# Patient Record
Sex: FEMALE | Race: WHITE | Employment: UNEMPLOYED | URBAN - NONMETROPOLITAN AREA
[De-identification: names, ages, dates, MRNs, and addresses within clinical notes are randomized per-mention and may not be internally consistent; named-entity substitution may affect disease eponyms.]

---

## 2018-07-19 ENCOUNTER — HOSPITAL ENCOUNTER (OUTPATIENT)
Dept: PHYSICAL THERAPY | Age: 53
Setting detail: THERAPIES SERIES
Discharge: HOME | End: 2018-07-19
Payer: MEDICARE

## 2018-07-19 PROCEDURE — G8978 MOBILITY CURRENT STATUS: HCPCS

## 2018-07-19 PROCEDURE — 97110 THERAPEUTIC EXERCISES: CPT

## 2018-07-19 PROCEDURE — G8979 MOBILITY GOAL STATUS: HCPCS

## 2018-07-19 PROCEDURE — 97161 PT EVAL LOW COMPLEX 20 MIN: CPT

## 2018-07-19 ASSESSMENT — PAIN DESCRIPTION - FREQUENCY: FREQUENCY: CONTINUOUS

## 2018-07-19 ASSESSMENT — PAIN DESCRIPTION - PROGRESSION: CLINICAL_PROGRESSION: NOT CHANGED

## 2018-07-19 ASSESSMENT — PAIN SCALES - GENERAL: PAINLEVEL_OUTOF10: 7

## 2018-07-19 ASSESSMENT — PAIN DESCRIPTION - ORIENTATION: ORIENTATION: LEFT;RIGHT

## 2018-07-19 ASSESSMENT — PAIN DESCRIPTION - LOCATION: LOCATION: BACK

## 2018-07-19 ASSESSMENT — PAIN DESCRIPTION - DESCRIPTORS: DESCRIPTORS: SHOOTING;NUMBNESS;TINGLING

## 2018-07-19 NOTE — PROGRESS NOTES
of low complexity. Goals:  Patient goals : less back pain, move better    Short term goals  Time Frame for Short term goals: 4 weeks   Short term goal 1: improve LMLBPQ by  4 point   Short term goal 2: increase left hip flex to 95, extension to -5,   Short term goal 3: pt to demostrate good technique for 15 reps of lumbr stabs in supine and standing  Short term goal 4: increease left leg strnegth to 4- to allow for improved posture fo rsit to stand    Long term goals  Time Frame for Long term goals : 8-12 weeks  Long term goal 1: RMLPQ improve from a 21 to 14  Long term goal 2: increase left hip ext to 0 , flex to 125 to allow for good body mechanics while cleaning house  Long term goal 3: increase left leg strength to 4+   Long term goal 4: pat with be I in HEP     PT G-Codes  Functional Assessment Tool Used: Ramses pradeep LBPQ   Score: 21  Functional Limitation: Mobility: Walking and moving around  Mobility: Walking and Moving Around Current Status (): At least 80 percent but less than 100 percent impaired, limited or restricted  Mobility: Walking and Moving Around Goal Status ():  At least 40 percent but less than 60 percent impaired, limited or restricted    Beatriz Contreras PT

## 2018-07-23 ENCOUNTER — HOSPITAL ENCOUNTER (OUTPATIENT)
Dept: PHYSICAL THERAPY | Age: 53
Setting detail: THERAPIES SERIES
Discharge: HOME | End: 2018-07-23
Payer: MEDICARE

## 2018-07-23 PROCEDURE — 97110 THERAPEUTIC EXERCISES: CPT

## 2018-07-23 ASSESSMENT — PAIN DESCRIPTION - ORIENTATION: ORIENTATION: LEFT;RIGHT

## 2018-07-23 ASSESSMENT — PAIN DESCRIPTION - LOCATION: LOCATION: BACK;LEG

## 2018-07-23 ASSESSMENT — PAIN DESCRIPTION - PAIN TYPE: TYPE: CHRONIC PAIN

## 2018-07-23 ASSESSMENT — PAIN DESCRIPTION - DIRECTION: RADIATING_TOWARDS: L>R

## 2018-07-23 ASSESSMENT — PAIN SCALES - GENERAL: PAINLEVEL_OUTOF10: 8

## 2018-07-26 ENCOUNTER — HOSPITAL ENCOUNTER (OUTPATIENT)
Dept: PHYSICAL THERAPY | Age: 53
Setting detail: THERAPIES SERIES
Discharge: HOME | End: 2018-07-26
Payer: MEDICARE

## 2018-07-26 PROCEDURE — 97530 THERAPEUTIC ACTIVITIES: CPT

## 2018-07-26 PROCEDURE — 97110 THERAPEUTIC EXERCISES: CPT

## 2018-07-26 ASSESSMENT — PAIN DESCRIPTION - LOCATION: LOCATION: BACK;LEG

## 2018-07-26 ASSESSMENT — PAIN DESCRIPTION - ORIENTATION: ORIENTATION: LEFT

## 2018-07-26 ASSESSMENT — PAIN DESCRIPTION - PAIN TYPE: TYPE: CHRONIC PAIN

## 2018-07-26 ASSESSMENT — PAIN SCALES - GENERAL: PAINLEVEL_OUTOF10: 7

## 2018-07-30 ENCOUNTER — HOSPITAL ENCOUNTER (OUTPATIENT)
Dept: PHYSICAL THERAPY | Age: 53
Setting detail: THERAPIES SERIES
Discharge: HOME | End: 2018-07-30
Payer: MEDICARE

## 2018-07-30 PROCEDURE — 97110 THERAPEUTIC EXERCISES: CPT

## 2018-07-30 ASSESSMENT — PAIN DESCRIPTION - LOCATION: LOCATION: BACK;HIP

## 2018-07-30 ASSESSMENT — PAIN SCALES - GENERAL: PAINLEVEL_OUTOF10: 5

## 2018-07-30 ASSESSMENT — PAIN DESCRIPTION - ORIENTATION: ORIENTATION: LEFT

## 2018-07-30 ASSESSMENT — PAIN DESCRIPTION - PAIN TYPE: TYPE: CHRONIC PAIN

## 2018-07-30 NOTE — PROGRESS NOTES
functional moibility         Activity Tolerance:  Activity Tolerance: Patient Tolerated treatment well  Activity Tolerance: Progressed core stabs and no increased pain. \"My legs are tired. \"    Assessment: Body structures, Functions, Activity limitations: Decreased functional mobility , Decreased ADL status, Decreased ROM, Decreased strength  Assessment: Pt progressed with core stabs and 0 increased pain \"My legs are sore. \" Monitor pain. Abd bracing with all activities.    Prognosis: Good  Discharge Recommendations: Continue to assess pending progress    Patient Education:  Patient Education: HEP, abd bracing with all exs                      Plan:  Times per week: 2  Plan weeks: 4-12  Specific instructions for Next Treatment: lumbar stabs, no LROM , hip ROM, B LE strengthening,   Current Treatment Recommendations: Strengthening, ROM, Positioning, Home Exercise Program, Modalities    Goals:  Patient goals : less back pain, move better    Short term goals  Time Frame for Short term goals: 4 weeks   Short term goal 1: improve LMLBPQ by  4 point   Short term goal 2: increase left hip flex to 95, extension to -5,   Short term goal 3: pt to demostrate good technique for 15 reps of lumbr stabs in supine and standing  Short term goal 4: increease left leg strnegth to 4- to allow for improved posture fo rsit to stand    Long term goals  Time Frame for Long term goals : 8-12 weeks  Long term goal 1: RMLPQ improve from a 21 to 14  Long term goal 2: increase left hip ext to 0 , flex to 125 to allow for good body mechanics while cleaning house  Long term goal 3: increase left leg strength to 4+   Long term goal 4: pat with be I in [employer]  [id number]

## 2018-08-02 ENCOUNTER — OFFICE VISIT (OUTPATIENT)
Dept: PHYSICAL MEDICINE AND REHAB | Age: 53
End: 2018-08-02
Payer: MEDICARE

## 2018-08-02 VITALS
DIASTOLIC BLOOD PRESSURE: 91 MMHG | SYSTOLIC BLOOD PRESSURE: 150 MMHG | HEIGHT: 64 IN | WEIGHT: 201 LBS | HEART RATE: 65 BPM | BODY MASS INDEX: 34.31 KG/M2

## 2018-08-02 DIAGNOSIS — G89.4 CHRONIC PAIN SYNDROME: ICD-10-CM

## 2018-08-02 DIAGNOSIS — M47.816 LUMBAR SPONDYLOSIS: Primary | ICD-10-CM

## 2018-08-02 DIAGNOSIS — M62.838 SPASM OF MUSCLE: ICD-10-CM

## 2018-08-02 DIAGNOSIS — M96.1 POST LAMINECTOMY SYNDROME: ICD-10-CM

## 2018-08-02 DIAGNOSIS — M54.16 LUMBAR RADICULOPATHY: ICD-10-CM

## 2018-08-02 DIAGNOSIS — M48.062 LUMBAR STENOSIS WITH NEUROGENIC CLAUDICATION: ICD-10-CM

## 2018-08-02 PROCEDURE — 99204 OFFICE O/P NEW MOD 45 MIN: CPT | Performed by: NURSE PRACTITIONER

## 2018-08-02 RX ORDER — TIZANIDINE HYDROCHLORIDE 2 MG/1
2 CAPSULE, GELATIN COATED ORAL 3 TIMES DAILY PRN
Qty: 90 CAPSULE | Refills: 0 | Status: SHIPPED | OUTPATIENT
Start: 2018-08-02 | End: 2018-10-15 | Stop reason: DRUGHIGH

## 2018-08-02 ASSESSMENT — ENCOUNTER SYMPTOMS
CONSTIPATION: 1
SORE THROAT: 0
CHEST TIGHTNESS: 0
SINUS PRESSURE: 0
EYE REDNESS: 0
EYE ITCHING: 0
EYE PAIN: 0
SHORTNESS OF BREATH: 0
SINUS PAIN: 0
DIARRHEA: 1
RHINORRHEA: 0
ABDOMINAL PAIN: 1
COUGH: 0
BACK PAIN: 1
COLOR CHANGE: 0

## 2018-08-02 NOTE — PROGRESS NOTES
[address] [facility]  [address]  Dept: [phone]  Dept Fax: [phone]: [phone]    Visit Date: 8/2/2018    Srinivasa Costa is a 48 y.o. female who is referred for pain management evaluation and treatment per Dr. Wendy Gomez. CAGE and CAGE-AID Questions   1. In the last three months, have you felt you should cut down or stop drinking or using drugs? Yes []        No [x]     2. In the last three months, has anyone annoyed you or gotten on your nerves by telling you to cut down or stop drinking or using drugs? Yes []        No [x]     3. In the last three months, have you felt guilty or bad about how much you drink or use drugs? Yes []        No [x]     4. In the last three months, have you been waking up wanting to have an alcoholic drink or use drugs? Yes []        No [x]        Opioid Risk Tool:  Clinician Form       1. Family History of Substance Abuse: Female Male    Alcohol   []1   []3    Illegal drugs   []2   []3    Prescription drugs     []4   []4   2. Personal History of Substance Abuse:          Alcohol   []3   []3    Illegal drugs   []4   []4    Prescription drugs     []5   []5   3. Age (valentino box if between 12 and 39):     []1   []1   4. History of Preadolescent Sexual Abuse:     []3   []0   5. Psychological Disease:      Attention deficit disorder, obsessive-compulsive disorder, bipolar, schizophrenia   []2   []2      Depression     []1   []1    Scoring Totals       Total Score  Low Risk  Moderate Risk  High Risk   Risk Category   0 - 3   4 - 7   8 or Above      Patient states symptoms interfere with:  A.  General Activity:  yes   B. Mood: yes    C. Walking Ability:   yes   D. Normal Work (Includes both work outside the home and housework):   yes    E.  Relations with Other People:  yes   F. Sleep:   yes   G.  Enjoyment of Life:  yes       HPI:     Chief Complaint: Low back pain    HPI     Patient here today for new patient evaluation for low back pain. patient s/p lumbar fusion of L5-S1 in 2010 then up to L2 in 2016 with Dr. Angeline Jones. Patient states that initially in 2016 pain was helped for a few days, but pain returned and worsened. Patient now with radicular pain into the left leg constantly, sometimes the right leg but getting more frequent. Patient states back pain is about 60% of her pain and her left leg pain is about 40% of her pain. Patient's most recent MRI from prior to 2016 surgery, reviewed with patient. Discussed updated MRI to visualize facet joints for further planning. Patient understanding. Patient presents for presents evaluation of Low back pain. Symptoms have been present for 10+ years. Patient reports no known injury. Patient describes symptoms as numbness in left leg and feet and left low back and pain in low back, left and right legs (aching, pressure, sharp, shooting, stabbing, throbbing and constant in character; 9/10 in severity). Symptoms are worst: constant. Alleviating factors identifiable by patient are position changes. Exacerbating factors identifiable by patient are recumbency, sitting, standing, walking and same position for extended periods of time. Treatments so far initiated by patient include Physical therapy, ice, heat, home exercises, surgery, NSAIDs and tylenol. Treatments that were helpful were physical therapy while present in PT, TENs unit, , ice, heat, home exercises, surgery, NSAIDs and tylenol but all without long lasting relief. The patient is allergic to other; penicillins; and sulfa antibiotics. Past Medical History  Peri Jones  has a past medical history of Arthritis; Diabetes mellitus ([address] GERD (gastroesophageal reflux disease); Hypertension; and Thyroid disease. Past Surgical History  The patient  has a past surgical history that includes Cholecystectomy; Hysterectomy;  Appendectomy; Tonsillectomy; Colonoscopy; Lumbar spine surgery (2016); Cervical spine surgery; Foot surgery (Right); and lumbar laminectomy (1-29-14). Family History  This patient's family history includes Cancer in her father and sister; Diabetes in her brother, father, and mother; Heart Disease in her brother, father, and mother; High Blood Pressure in her father and mother; Stroke in her father. Social History  Juan Christie  reports that she has been smoking. She has a 0.50 pack-year smoking history. She has never used smokeless tobacco. She reports that she uses drugs, including Marijuana. She reports that she does not drink alcohol. Medications    Current Outpatient Prescriptions:     tiZANidine (ZANAFLEX) 2 MG capsule, Take 1 capsule by mouth 3 times daily as needed for Muscle spasms, Disp: 90 capsule, Rfl: 0    losartan-hydrochlorothiazide (HYZAAR) 100-25 MG per tablet, Take 1 tablet by mouth daily. , Disp: , Rfl:     levothyroxine (SYNTHROID) 175 MCG tablet, Take 175 mcg by mouth Daily. , Disp: , Rfl:     gabapentin (NEURONTIN) 400 MG capsule, Take 400 mg by mouth 3 times daily. , Disp: , Rfl:     citalopram (CELEXA) 40 MG tablet, Take 40 mg by mouth daily. , Disp: , Rfl:     metoprolol (TOPROL-XL) 50 MG XL tablet, Take 50 mg by mouth daily. , Disp: , Rfl:     metFORMIN (GLUCOPHAGE) 500 MG tablet, Take 500 mg by mouth daily (with breakfast). , Disp: , Rfl:     buPROPion SR (WELLBUTRIN SR) 150 MG SR tablet, Take 150 mg by mouth daily. , Disp: , Rfl:     simvastatin (ZOCOR) 20 MG tablet, Take 20 mg by mouth daily. , Disp: , Rfl:     aspirin 81 MG tablet, Take 81 mg by mouth daily. , Disp: , Rfl:     Subjective:      Review of Systems   Constitutional: Positive for activity change and fatigue. Negative for chills, diaphoresis and fever. HENT: Negative for congestion, postnasal drip, rhinorrhea, sinus pain, sinus pressure, sneezing and sore throat. Eyes: Negative for pain, redness and itching.    Respiratory: noted. She is not diaphoretic. No erythema. No pallor. Psychiatric: She has a normal mood and affect. Her speech is normal and behavior is normal. Judgment and thought content normal.     SIMA test: positive bilateral  Yeoman's test: positive bilateral  Gaenslen test: positive bilateral     Assessment:     1. Lumbar spondylosis    2. Lumbar radiculopathy    3. Lumbar stenosis with neurogenic claudication    4. Post laminectomy syndrome    5. Chronic pain syndrome    6. Spasm of muscle            Plan:      · Patient read and signed orientation and opioid agreement. · OARRS reviewed. · Discussed long term side effects of medications. · Discussed tolerance, dependency and addiction. · UDS preformed today. - patient with marijuana use daily. Discussed policy with marjuana use and narcotics medications  · Patient told can not receive any pain medications from any other source. · Discussed with patient they may not be pain free. · No evidence of abuse, diversion or aberrant behavior. · Prescription Needs: No prescription pain medications at this time   Medications and/or procedures to improve function and quality of life- patient understanding with this and that may not be pain free  Testing:  MRI Lumbar spine w/wo contrast  Procedures:  Await MRI - discussed L-facet, TFESI, SCS  Discussed with patient about risks with procedure including infection, reaction to medication, increased pain, or bleeding. Medications: Continue OTC medication, zanaflex as ordered       Previous Treatments tried:  · PT: Yes,  any benefit? Yes, short term relief, in PT right now  · NSAIDs: Yes,  any benefit? Yes,  · Chiropractic: No,    · Muscle relaxants: Yes,  Makes her too tired (flexeril)  · Narcotics: No,    · Spine surgeon consult: Yes  · Any Implants: No    Meds.  Prescribed:   Orders Placed This Encounter   Medications    tiZANidine (ZANAFLEX) 2 MG capsule     Sig: Take 1 capsule by mouth 3 times daily as needed for Muscle

## 2018-08-03 ENCOUNTER — HOSPITAL ENCOUNTER (OUTPATIENT)
Dept: PHYSICAL THERAPY | Age: 53
Setting detail: THERAPIES SERIES
Discharge: HOME | End: 2018-08-03
Payer: MEDICARE

## 2018-08-03 PROCEDURE — 97110 THERAPEUTIC EXERCISES: CPT

## 2018-08-03 ASSESSMENT — PAIN DESCRIPTION - ORIENTATION: ORIENTATION: LEFT

## 2018-08-03 ASSESSMENT — PAIN DESCRIPTION - PAIN TYPE: TYPE: CHRONIC PAIN

## 2018-08-03 ASSESSMENT — PAIN DESCRIPTION - LOCATION: LOCATION: BACK;HIP

## 2018-08-03 ASSESSMENT — PAIN SCALES - GENERAL: PAINLEVEL_OUTOF10: 7

## 2018-08-03 NOTE — PROGRESS NOTES
[address]  OUTPATIENT PHYSICAL THERAPY  DAILY NOTE  Jesús De La Cruz     Time In: 1120  Time Out: 0783  Minutes: 33  Timed Code Treatment Minutes: 33 Minutes     Date: 8/3/2018  Patient Name: Kalpesh Call,  Gender:  female        CSN: [id number]   : 1965  (48 y.o.)  Referral Date : 18    Referring Practitioner: Dr Jacinta Maciel      Diagnosis: lumbar spondylolithesis, low back pain, sp fusion  Treatment Diagnosis: SP lumbar fusion, spondylolithesis, back pain, left LE weakness,    Additional Pertinent Hx: surgery ,  most recent back  surgery 2016 fusion  to mid thoracic region. General:  PT Visit Information  Onset Date: 07/12/10  PT Insurance Information: medicare and medicaid. Total # of Visits to Date: 5               Subjective:  Chart Reviewed: Yes  Response To Previous Treatment: Patient with no complaints from previous session. Other (Comment): Aug 2nd -- Dr Keegan Khan? ? for injections     Subjective: Reports waking with increased pain today     Pain:  Patient Currently in Pain: Yes  Pain Assessment: 0-10  Pain Level: 7  Pain Type: Chronic pain  Pain Location: Back, Hip  Pain Orientation: Left      Objective                                                                                                                          Exercises  Exercise 1: Nu Step x 5 min, level 5, seat 8, arms 8  Exercise 2: Standing B heel raises, marchnig. squats x 15, 3- way hip without resistance x 10 ea  Exercise 3: Seated on swiss ball- te sets, abd sets, pelvic tilts x 10  Exercise 4: On swiss ball DLSP-UE x 10, LE x 10, UE/LE x10  Exercise 5: Seated on blue swiss ball - UE T band ther ex Red flex, ext row x 10 ea  Exercise 6: LTR x 10 ea, SKC  3 for 15 sec, B hamstring, piriformis ( Diag KTC)  Exercise 10: all exercise to stab the lumbar spine, stretch Both hips and strengthen the left LE to increase functional moibility         Activity Tolerance:  Activity Tolerance:

## 2018-08-07 ENCOUNTER — HOSPITAL ENCOUNTER (OUTPATIENT)
Dept: PHYSICAL THERAPY | Age: 53
Setting detail: THERAPIES SERIES
Discharge: HOME | End: 2018-08-07
Payer: MEDICARE

## 2018-08-07 PROCEDURE — 97110 THERAPEUTIC EXERCISES: CPT

## 2018-08-07 ASSESSMENT — PAIN DESCRIPTION - ORIENTATION: ORIENTATION: LEFT

## 2018-08-07 ASSESSMENT — PAIN DESCRIPTION - LOCATION: LOCATION: BACK;HIP

## 2018-08-07 ASSESSMENT — PAIN SCALES - GENERAL: PAINLEVEL_OUTOF10: 8

## 2018-08-07 NOTE — PROGRESS NOTES
Activity limitations: Decreased functional mobility , Decreased ADL status, Decreased ROM, Decreased strength  Prognosis: Good  Discharge Recommendations: Continue to assess pending progress    Patient Education:  Patient Education: ther ex                       Plan:  Times per week: 2  Plan weeks: 4-12  Specific instructions for Next Treatment: lumbar stabs, no LROM , hip ROM, B LE strengthening,   Current Treatment Recommendations: Strengthening, ROM, Positioning, Home Exercise Program, Modalities    Goals:  Patient goals : less back pain, move better    Short term goals  Time Frame for Short term goals: 4 weeks   Short term goal 1: improve LMLBPQ by  4 point   Short term goal 2: increase left hip flex to 95, extension to -5,   Short term goal 3: pt to demostrate good technique for 15 reps of lumbr stabs in supine and standing  Short term goal 4: increease left leg strnegth to 4- to allow for improved posture fo rsit to stand    Long term goals  Time Frame for Long term goals : 8-12 weeks  Long term goal 1: RMLPQ improve from a 21 to 14  Long term goal 2: increase left hip ext to 0 , flex to 125 to allow for good body mechanics while cleaning house  Long term goal 3: increase left leg strength to 4+   Long term goal 4: pat with be I in [facility]. [address]  [id number]

## 2018-08-08 ENCOUNTER — HOSPITAL ENCOUNTER (OUTPATIENT)
Dept: MRI IMAGING | Age: 53
Discharge: HOME | End: 2018-08-08
Payer: MEDICARE

## 2018-08-08 DIAGNOSIS — M96.1 POST LAMINECTOMY SYNDROME: ICD-10-CM

## 2018-08-08 DIAGNOSIS — M54.16 LUMBAR RADICULOPATHY: ICD-10-CM

## 2018-08-08 PROCEDURE — A9579 GAD-BASE MR CONTRAST NOS,1ML: HCPCS | Performed by: NURSE PRACTITIONER

## 2018-08-08 PROCEDURE — 6360000004 HC RX CONTRAST MEDICATION: Performed by: NURSE PRACTITIONER

## 2018-08-08 PROCEDURE — 72158 MRI LUMBAR SPINE W/O & W/DYE: CPT

## 2018-08-08 RX ADMIN — GADOTERIDOL 15 ML: 279.3 INJECTION, SOLUTION INTRAVENOUS at 19:39

## 2018-08-10 ENCOUNTER — HOSPITAL ENCOUNTER (OUTPATIENT)
Dept: PHYSICAL THERAPY | Age: 53
Setting detail: THERAPIES SERIES
Discharge: HOME | End: 2018-08-10
Payer: MEDICARE

## 2018-08-10 PROCEDURE — 97530 THERAPEUTIC ACTIVITIES: CPT

## 2018-08-10 PROCEDURE — 97110 THERAPEUTIC EXERCISES: CPT

## 2018-08-10 ASSESSMENT — PAIN DESCRIPTION - PAIN TYPE: TYPE: CHRONIC PAIN

## 2018-08-10 ASSESSMENT — PAIN DESCRIPTION - ORIENTATION: ORIENTATION: LEFT

## 2018-08-10 ASSESSMENT — PAIN DESCRIPTION - LOCATION: LOCATION: BACK;HIP

## 2018-08-10 ASSESSMENT — PAIN SCALES - GENERAL: PAINLEVEL_OUTOF10: 7

## 2018-08-13 ENCOUNTER — OFFICE VISIT (OUTPATIENT)
Dept: PHYSICAL MEDICINE AND REHAB | Age: 53
End: 2018-08-13
Payer: MEDICARE

## 2018-08-13 VITALS
HEIGHT: 64 IN | WEIGHT: 201 LBS | DIASTOLIC BLOOD PRESSURE: 80 MMHG | HEART RATE: 60 BPM | BODY MASS INDEX: 34.31 KG/M2 | SYSTOLIC BLOOD PRESSURE: 135 MMHG

## 2018-08-13 DIAGNOSIS — M54.50 LUMBAR PAIN: ICD-10-CM

## 2018-08-13 DIAGNOSIS — M96.1 POST LAMINECTOMY SYNDROME: ICD-10-CM

## 2018-08-13 DIAGNOSIS — G89.4 CHRONIC PAIN SYNDROME: ICD-10-CM

## 2018-08-13 DIAGNOSIS — M47.816 LUMBAR SPONDYLOSIS: ICD-10-CM

## 2018-08-13 DIAGNOSIS — M54.16 LUMBAR RADICULOPATHY: Primary | ICD-10-CM

## 2018-08-13 PROCEDURE — 99213 OFFICE O/P EST LOW 20 MIN: CPT | Performed by: NURSE PRACTITIONER

## 2018-08-13 ASSESSMENT — ENCOUNTER SYMPTOMS
COUGH: 0
DIARRHEA: 1
CONSTIPATION: 1
CHEST TIGHTNESS: 0
SHORTNESS OF BREATH: 0
COLOR CHANGE: 0
BACK PAIN: 1
ABDOMINAL PAIN: 1

## 2018-08-13 NOTE — PROGRESS NOTES
since last visit. Pain scale with out pain medications is 7/10. Last dose of Zanaflex was yesterday  Drug screen reviewed from 8/2/18 and was + [facility] - patient known smoker      DIAGNOSTICS   PROCEDURE: MRI LUMBAR SPINE W WO CONTRAST 8/8/18       CLINICAL INFORMATION: Lumbar radiculopathy, Post laminectomy syndrome.       COMPARISON: MRI of the lumbar spine dated June 20, 2013.       TECHNIQUE: Sagittal and axial T1 and T2-weighted images were obtained to the lumbar spine. Postcontrast axial and sagittal T1-weighted images were also obtained. Note is made that a metal artifact reducing protocol was utilized.       FINDINGS:       The images are significantly degraded by susceptibility artifact secondary to metallic surgical hardware. This limits evaluation.       The lumbar spine is imaged from the inferior aspect of T10 to the superior aspect of S3. The conus medullaris terminates at the L1 level. No abnormal signal or expansion is present within the conus. There is crowding of the nerve roots at the L1-L2    level. No abnormal enhancement is seen within the conus or visualized nerve roots.        Surgical hardware is present from L2 to S1. An intervertebral device is noted at L4-L5. The expected lumbar lordosis is preserved. Retrolisthesis is present of L1 on L2 which measures approximately 0.4 cm. This is new from the prior exam. Type I Modic    changes are noted involving the anterior aspect of the endplates at X07-Y28. Type III Modic changes are present at the endplates of J8-A5. No compression fracture deformity or suspicious marrow replacing lesion is identified.       With regards to the disc spaces, at L1-L2, there is a disc osteophyte complex which causes moderate spinal canal narrowing. There is suggestion of mild neural foraminal narrowing on the right with moderate neural foraminal narrowing on the left.  These    changes are worse compared to the prior exam.       At L2-L3, there is a disc osteophyte complex. Postsurgical changes are present and the spinal canal is patent. Susceptibility artifact limits evaluation of the neural foramina however, no significant stenosis is identified.       At L3-L4, there is a disc osteophyte complex. Postsurgical changes are present and the spinal canal is patent. Evaluation of the neural foramina is limited secondary to susceptibility artifact however, no significant stenosis is identified.       The remaining levels are secured secondary to susceptibility artifact.       There is a fluid collection within the laminectomy bed extending from L3 to L5. It measures approximately 1.0 x 2.0 x 7.5 cm. No suspicious finding is otherwise identified within the visualized retroperitoneal or paraspinal soft tissues. Note is made    that there is fatty atrophy of the posterior paraspinal musculature.           Impression       1. Postsurgical and multilevel degenerative changes are present within the lumbar spine and are further discussed by level in the findings. Most significantly, there are worsened degenerative changes at L1-L2 with a disc osteophyte complex causing    moderate spinal canal narrowing. There is also suggestion of moderate neural foraminal narrowing on the left at this level with mild neural foraminal narrowing on the right. Note is made that the L4-5 and L5-S1 levels are significantly degraded secondary    to susceptibility artifact from surgical hardware. If clinically warranted, CT myelogram may be considered.       2. There is a fluid collection within the laminectomy bed extending from L3 to L5. This likely corresponds to a postsurgical seroma.             The patient is allergic to other; penicillins; and sulfa antibiotics. Past Medical History  Duard Rubinstein  has a past medical history of Arthritis; Diabetes mellitus ([address] GERD (gastroesophageal reflux disease); Hypertension; and Thyroid disease.     Past Surgical History  The patient  has a past surgical history that includes Cholecystectomy; Hysterectomy; Appendectomy; Tonsillectomy; Colonoscopy; Lumbar spine surgery (2016); Cervical spine surgery; Foot surgery (Right); and lumbar laminectomy (1-29-14). Family History  This patient's family history includes Cancer in her father and sister; Diabetes in her brother, father, and mother; Heart Disease in her brother, father, and mother; High Blood Pressure in her father and mother; Stroke in her father. Social History  Daren Inman  reports that she has been smoking. She has a 0.50 pack-year smoking history. She has never used smokeless tobacco. She reports that she uses drugs, including Marijuana. She reports that she does not drink alcohol. Medications    Current Outpatient Prescriptions:     tiZANidine (ZANAFLEX) 2 MG capsule, Take 1 capsule by mouth 3 times daily as needed for Muscle spasms, Disp: 90 capsule, Rfl: 0    losartan-hydrochlorothiazide (HYZAAR) 100-25 MG per tablet, Take 1 tablet by mouth daily. , Disp: , Rfl:     levothyroxine (SYNTHROID) 175 MCG tablet, Take 175 mcg by mouth Daily. , Disp: , Rfl:     gabapentin (NEURONTIN) 400 MG capsule, Take 400 mg by mouth 3 times daily. , Disp: , Rfl:     citalopram (CELEXA) 40 MG tablet, Take 40 mg by mouth daily. , Disp: , Rfl:     metoprolol (TOPROL-XL) 50 MG XL tablet, Take 50 mg by mouth daily. , Disp: , Rfl:     metFORMIN (GLUCOPHAGE) 500 MG tablet, Take 500 mg by mouth daily (with breakfast). , Disp: , Rfl:     buPROPion SR (WELLBUTRIN SR) 150 MG SR tablet, Take 150 mg by mouth daily. , Disp: , Rfl:     simvastatin (ZOCOR) 20 MG tablet, Take 20 mg by mouth daily. , Disp: , Rfl:     aspirin 81 MG tablet, Take 81 mg by mouth daily. , Disp: , Rfl:     Subjective:      Review of Systems   Constitutional: Positive for activity change and fatigue. Negative for chills, diaphoresis and fever. Respiratory: Negative for cough, chest tightness and shortness of breath.     Cardiovascular: Negative for chest pain and spouse

## 2018-08-14 ENCOUNTER — APPOINTMENT (OUTPATIENT)
Dept: PHYSICAL THERAPY | Age: 53
End: 2018-08-14
Payer: MEDICARE

## 2018-08-15 ENCOUNTER — HOSPITAL ENCOUNTER (OUTPATIENT)
Dept: PHYSICAL THERAPY | Age: 53
Setting detail: THERAPIES SERIES
Discharge: HOME | End: 2018-08-15
Payer: MEDICARE

## 2018-08-15 PROCEDURE — 97110 THERAPEUTIC EXERCISES: CPT

## 2018-08-15 ASSESSMENT — PAIN DESCRIPTION - PAIN TYPE: TYPE: CHRONIC PAIN

## 2018-08-15 ASSESSMENT — PAIN DESCRIPTION - LOCATION: LOCATION: BACK;HIP

## 2018-08-15 ASSESSMENT — PAIN SCALES - GENERAL: PAINLEVEL_OUTOF10: 6

## 2018-08-15 ASSESSMENT — PAIN DESCRIPTION - ORIENTATION: ORIENTATION: LEFT

## 2018-08-15 NOTE — PROGRESS NOTES
[address]  OUTPATIENT PHYSICAL THERAPY  DAILY NOTE  Graciela Watts     Time In: 3428  Time Out: 1110  Minutes: 30  Timed Code Treatment Minutes: 30 Minutes     Date: 8/15/2018  Patient Name: Keanu Charles,  Gender:  female        CSN: [id number]   : 1965  (48 y.o.)  Referral Date : 18    Referring Practitioner: Dr Guilherme Lara      Diagnosis: lumbar spondylolithesis, low back pain, sp fusion  Treatment Diagnosis: SP lumbar fusion, spondylolithesis, back pain, left LE weakness,    Additional Pertinent Hx: surgery ,  most recent back  surgery 2016 fusion  to mid thoracic region. General:  PT Visit Information  Onset Date: 07/12/10  PT Insurance Information: medicare and medicaid. Total # of Visits to Date: 8  Plan of Care/Certification Expiration Date: 10/11/18               Subjective:  Chart Reviewed: Yes  Other (Comment): Aug 2nd -- Dr Vamsi Watson for injections     Subjective: Patient reports her lower back pain is 6/10, no significant change with therapy. Still has difficulty bending to reach items off the floor, walking, and sitting long durations. She had an MRI last week which shows L1/L2 disc bulge pinching the spinal cord. She still occasionally has LLE radicular pain down to calf and foot, and L calf swelling.       Pain:  Patient Currently in Pain: Yes  Pain Assessment: 0-10  Pain Level: 6  Pain Type: Chronic pain  Pain Location: Back, Hip  Pain Orientation: Left  Pain Radiating Towards: L >R      Objective    Exercises  Exercise 1: Nu Step x 5 min, level 5, seat 8, arms 8   Exercise 2: Standing B heel raises, marchnig. squats x 15, 3- way hip without resistance x 10 ea   Exercise 3: Seated on swiss ball- glute sets, abd sets, pelvic tilts x 10   Exercise 4: On swiss ball DLSP-UE x 10, LE x 10, UE/LE x10  Exercise 5: Seated on blue swiss ball - UE T band ther ex Red flex, ext row x 10 ea   Exercise 6: LTR x 10 ea, Left SKC  3 for 15 sec, B hamstring with towel 3x 10 sec, piriformis ( Diag KT)   Exercise 7: Trialed bridge x10 reps   Exercise 10: all exercise to stab the lumbar spine, stretch Both hips and strengthen the left LE to increase functional moibility         Activity Tolerance:  Activity Tolerance: Patient Tolerated treatment well    Assessment: Body structures, Functions, Activity limitations: Decreased functional mobility , Decreased ADL status, Decreased ROM, Decreased strength  Assessment: Minimal change in pain with therapy. Patient planning to discharge next appointment.    Prognosis: Good  Discharge Recommendations: Continue to assess pending progress    Patient Education:  Patient Education: ther ex                       Plan:  Times per week: 2  Plan weeks: 4-12  Specific instructions for Next Treatment: lumbar stabs, no LROM , hip ROM, B LE strengthening,   Current Treatment Recommendations: Strengthening, ROM, Positioning, Home Exercise Program, Modalities    Goals:  Patient goals : less back pain, move better    Short term goals  Time Frame for Short term goals: 4 weeks   Short term goal 1: improve LMLBPQ by  4 point   Short term goal 2: increase left hip flex to 95, extension to -5,   Short term goal 3: pt to demostrate good technique for 15 reps of lumbr stabs in supine and standing  Short term goal 4: increease left leg strnegth to 4- to allow for improved posture fo rsit to stand    Long term goals  Time Frame for Long term goals : 8-12 weeks  Long term goal 1: RMLPQ improve from a 21 to 14  Long term goal 2: increase left hip ext to 0 , flex to 125 to allow for good body mechanics while cleaning house  Long term goal 3: increase left leg strength to 4+   Long term goal 4: pat with be I in [facility] [place], PT

## 2018-08-16 ENCOUNTER — HOSPITAL ENCOUNTER (OUTPATIENT)
Dept: PHYSICAL THERAPY | Age: 53
Setting detail: THERAPIES SERIES
Discharge: HOME | End: 2018-08-16
Payer: MEDICARE

## 2018-08-16 PROCEDURE — G8979 MOBILITY GOAL STATUS: HCPCS

## 2018-08-16 PROCEDURE — G8980 MOBILITY D/C STATUS: HCPCS

## 2018-08-16 PROCEDURE — 97110 THERAPEUTIC EXERCISES: CPT

## 2018-08-16 ASSESSMENT — PAIN SCALES - GENERAL: PAINLEVEL_OUTOF10: 5

## 2018-08-16 NOTE — PROGRESS NOTES
[facility] [address]  OUTPATIENT PHYSICAL THERAPY  DISCHARGE NOTE  Nate Cain     Time In: 2840  Time Out: 2348  Minutes: 31  Timed Code Treatment Minutes: 31 Minutes     Date: 2018  Patient Name: Masoud Mcrae,  Gender:  female        CSN: [id number]   : 1965  (48 y.o.)  Referral Date : 18    Referring Practitioner: Dr Phuong Mistry      Diagnosis: lumbar spondylolithesis, low back pain, sp fusion  Treatment Diagnosis: SP lumbar fusion, spondylolithesis, back pain, left LE weakness,    Additional Pertinent Hx: surgery ,  most recent back  surgery 2016 fusion  to mid thoracic region. General:  PT Visit Information  Onset Date: 07/12/10  PT Insurance Information: medicare and medicaid. Total # of Visits to Date: 5  Plan of Care/Certification Expiration Date: 10/11/18               Subjective:  Chart Reviewed: Yes     Subjective: Patient reports her lower back pain is 5/10, She had an MRI last week which she reported shows L1/L2 disc bulge pinching the spinal cord. SHe is going to have injections through pain management on Set 11th. Pain:  Patient Currently in Pain: Yes  Pain Level: 5      Objective         Increased walking speed, and normal step length.    Exercises  Exercise 1: Nu Step x 5 min, level 5, seat 8, arms 8   Exercise 2: Standing B heel raises, marchnig. squats x 15, 3- way hip without resistance x 10 ea   Exercise 3: Seated on swiss ball- glute sets, abd sets, pelvic tilts x 10   Exercise 4: On swiss ball DLSP-UE x 10, LE x 10, UE/LE x10  Exercise 5: Seated on blue swiss ball - UE T band ther ex Red flex, ext row x 10 ea   Exercise 6: LTR x 10 ea, Left SKC  3 for 15 sec, B hamstring with towel 3x 10 sec, piriformis ( Diag KTC)   Exercise 7: bridge x10 reps   Exercise 10: all exercise to stab the lumbar spine, stretch Both hips and strengthen the left LE to increase functional moibility         Activity Tolerance:  Activity Tolerance: Patient Tolerated treatment well    Assessment: Body structures, Functions, Activity limitations: Decreased functional mobility , Decreased ADL status  Assessment: Minimal change in pain with therapy, but better motions. Prognosis: Good       Patient Education:  Patient Education: exericse, posture,                       Plan:  Times per week: discharge    Goals:  Patient goals : less back pain, move better    Short term goals  Time Frame for Short term goals: 4 weeks   Short term goal 1: improve LMLBPQ by 4 point -- partially met  cahnged from 21 to 19 points,     Short term goal 2: increase left hip flex to 95, extension to -5   MET    Short term goal 3: pt to demostrate good technique for 15 reps of lumbr stabs in supine and standing  MET    Short term goal 4: increase left leg strength to 4- to allow for improved posture for sit to stand -- NOT met now at 4/5  limited due to pain with resistance      Long term goals  Time Frame for Long term goals : 8-12 weeks  Long term goal 1: RMLPQ improve from a 21 to 14  NOT MET   Long term goal 2: increase left hip ext to 0 , flex to 125 to allow for good body mechanics while cleaning house  Partially met ,     hip flex to  115 on the left  , ext to 0    Long term goal 3: increase left leg strength to 4+   NOt met see above. Long term goal 4: pat with be I in HEP  MET      PT G-Codes  Functional Assessment Tool Used: Nichol Lyndon LBPQ   Score: 19  Functional Limitation: Mobility: Walking and moving around  Mobility: Walking and Moving Around Goal Status (): At least 40 percent but less than 60 percent impaired, limited or restricted  Mobility: Walking and Moving Around Discharge Status ():  At least 60 percent but less than 80 percent impaired, limited or restricted    Anshu Pelayo, PT

## 2018-09-11 ENCOUNTER — ANESTHESIA EVENT (OUTPATIENT)
Dept: OPERATING ROOM | Age: 53
End: 2018-09-11
Payer: MEDICARE

## 2018-09-11 ENCOUNTER — HOSPITAL ENCOUNTER (OUTPATIENT)
Age: 53
Setting detail: OUTPATIENT SURGERY
Discharge: HOME | End: 2018-09-11
Attending: PAIN MEDICINE | Admitting: PAIN MEDICINE
Payer: MEDICARE

## 2018-09-11 ENCOUNTER — APPOINTMENT (OUTPATIENT)
Dept: GENERAL RADIOLOGY | Age: 53
End: 2018-09-11
Attending: PAIN MEDICINE
Payer: MEDICARE

## 2018-09-11 ENCOUNTER — ANESTHESIA (OUTPATIENT)
Dept: OPERATING ROOM | Age: 53
End: 2018-09-11
Payer: MEDICARE

## 2018-09-11 VITALS
TEMPERATURE: 97.4 F | RESPIRATION RATE: 20 BRPM | HEART RATE: 56 BPM | DIASTOLIC BLOOD PRESSURE: 61 MMHG | BODY MASS INDEX: 36.86 KG/M2 | WEIGHT: 208 LBS | HEIGHT: 63 IN | SYSTOLIC BLOOD PRESSURE: 110 MMHG | OXYGEN SATURATION: 96 %

## 2018-09-11 VITALS
RESPIRATION RATE: 5 BRPM | DIASTOLIC BLOOD PRESSURE: 71 MMHG | SYSTOLIC BLOOD PRESSURE: 117 MMHG | OXYGEN SATURATION: 97 %

## 2018-09-11 LAB — GLUCOSE BLD-MCNC: 108 MG/DL (ref 70–108)

## 2018-09-11 PROCEDURE — 82948 REAGENT STRIP/BLOOD GLUCOSE: CPT

## 2018-09-11 PROCEDURE — 6360000004 HC RX CONTRAST MEDICATION: Performed by: PAIN MEDICINE

## 2018-09-11 PROCEDURE — 2709999900 HC NON-CHARGEABLE SUPPLY: Performed by: PAIN MEDICINE

## 2018-09-11 PROCEDURE — 7100000010 HC PHASE II RECOVERY - FIRST 15 MIN: Performed by: PAIN MEDICINE

## 2018-09-11 PROCEDURE — 3209999900 FLUORO FOR SURGICAL PROCEDURES

## 2018-09-11 PROCEDURE — 2580000003 HC RX 258: Performed by: PAIN MEDICINE

## 2018-09-11 PROCEDURE — 62323 NJX INTERLAMINAR LMBR/SAC: CPT | Performed by: PAIN MEDICINE

## 2018-09-11 PROCEDURE — 6360000002 HC RX W HCPCS: Performed by: PAIN MEDICINE

## 2018-09-11 PROCEDURE — 3600000054 HC PAIN LEVEL 3 BASE: Performed by: PAIN MEDICINE

## 2018-09-11 PROCEDURE — 2500000003 HC RX 250 WO HCPCS: Performed by: NURSE ANESTHETIST, CERTIFIED REGISTERED

## 2018-09-11 PROCEDURE — 3700000000 HC ANESTHESIA ATTENDED CARE: Performed by: PAIN MEDICINE

## 2018-09-11 PROCEDURE — 6360000002 HC RX W HCPCS: Performed by: NURSE ANESTHETIST, CERTIFIED REGISTERED

## 2018-09-11 PROCEDURE — 7100000011 HC PHASE II RECOVERY - ADDTL 15 MIN: Performed by: PAIN MEDICINE

## 2018-09-11 PROCEDURE — 2500000003 HC RX 250 WO HCPCS: Performed by: PAIN MEDICINE

## 2018-09-11 RX ORDER — 0.9 % SODIUM CHLORIDE 0.9 %
VIAL (ML) INJECTION PRN
Status: DISCONTINUED | OUTPATIENT
Start: 2018-09-11 | End: 2018-09-11 | Stop reason: HOSPADM

## 2018-09-11 RX ORDER — DEXAMETHASONE SODIUM PHOSPHATE 4 MG/ML
INJECTION, SOLUTION INTRA-ARTICULAR; INTRALESIONAL; INTRAMUSCULAR; INTRAVENOUS; SOFT TISSUE PRN
Status: DISCONTINUED | OUTPATIENT
Start: 2018-09-11 | End: 2018-09-11 | Stop reason: HOSPADM

## 2018-09-11 RX ORDER — LIDOCAINE HYDROCHLORIDE 20 MG/ML
INJECTION, SOLUTION INFILTRATION; PERINEURAL PRN
Status: DISCONTINUED | OUTPATIENT
Start: 2018-09-11 | End: 2018-09-11 | Stop reason: SDUPTHER

## 2018-09-11 RX ORDER — LIDOCAINE HYDROCHLORIDE 10 MG/ML
INJECTION, SOLUTION INFILTRATION; PERINEURAL PRN
Status: DISCONTINUED | OUTPATIENT
Start: 2018-09-11 | End: 2018-09-11 | Stop reason: HOSPADM

## 2018-09-11 RX ORDER — PROPOFOL 10 MG/ML
INJECTION, EMULSION INTRAVENOUS PRN
Status: DISCONTINUED | OUTPATIENT
Start: 2018-09-11 | End: 2018-09-11 | Stop reason: SDUPTHER

## 2018-09-11 RX ORDER — FENTANYL CITRATE 50 UG/ML
INJECTION, SOLUTION INTRAMUSCULAR; INTRAVENOUS PRN
Status: DISCONTINUED | OUTPATIENT
Start: 2018-09-11 | End: 2018-09-11 | Stop reason: SDUPTHER

## 2018-09-11 RX ORDER — BUPIVACAINE HYDROCHLORIDE 2.5 MG/ML
INJECTION, SOLUTION EPIDURAL; INFILTRATION; INTRACAUDAL PRN
Status: DISCONTINUED | OUTPATIENT
Start: 2018-09-11 | End: 2018-09-11 | Stop reason: HOSPADM

## 2018-09-11 RX ADMIN — FENTANYL CITRATE 50 MCG: 50 INJECTION INTRAMUSCULAR; INTRAVENOUS at 09:10

## 2018-09-11 RX ADMIN — LIDOCAINE HYDROCHLORIDE 20 MG: 20 INJECTION, SOLUTION INFILTRATION; PERINEURAL at 09:15

## 2018-09-11 RX ADMIN — FENTANYL CITRATE 50 MCG: 50 INJECTION INTRAMUSCULAR; INTRAVENOUS at 09:14

## 2018-09-11 RX ADMIN — PROPOFOL 60 MG: 10 INJECTION, EMULSION INTRAVENOUS at 09:15

## 2018-09-11 ASSESSMENT — PULMONARY FUNCTION TESTS
PIF_VALUE: 0

## 2018-09-11 ASSESSMENT — PAIN DESCRIPTION - DESCRIPTORS: DESCRIPTORS: ACHING;CONSTANT;DISCOMFORT

## 2018-09-11 ASSESSMENT — PAIN SCALES - GENERAL: PAINLEVEL_OUTOF10: 0

## 2018-09-11 ASSESSMENT — PAIN - FUNCTIONAL ASSESSMENT: PAIN_FUNCTIONAL_ASSESSMENT: 0-10

## 2018-09-11 NOTE — ANESTHESIA PRE PROCEDURE
02/10/2015    CREATININE 0.6 02/10/2015    LABGLOM >90 02/10/2015    GLUCOSE 100 02/10/2015    GLUCOSE 121 10/01/2011    PROT 7.3 02/10/2015    CALCIUM 9.4 02/10/2015    BILITOT 0.3 02/10/2015    ALKPHOS 102 02/10/2015    AST 20 02/10/2015    ALT 20 02/10/2015       POC Tests: No results for input(s): POCGLU, POCNA, POCK, POCCL, POCBUN, POCHEMO, POCHCT in the last 72 hours. Coags: No results found for: PROTIME, INR, APTT    HCG (If Applicable): No results found for: PREGTESTUR, PREGSERUM, HCG, HCGQUANT     ABGs: No results found for: PHART, PO2ART, WKA2IDS, PFO8WIN, BEART, T5SAIYCZ     Type & Screen (If Applicable):  Lab Results   Component Value Date    LABRH POS 01/29/2014       Anesthesia Evaluation    Airway: Mallampati: II       Mouth opening: > = 3 FB Dental:          Pulmonary:                              Cardiovascular:    (+) hypertension:,                   Neuro/Psych:               GI/Hepatic/Renal:   (+) GERD:,           Endo/Other:    (+) Diabetes, . Abdominal:   (+) obese,         Vascular:                                        Anesthesia Plan      MAC     ASA 3             Anesthetic plan and risks discussed with patient. Plan discussed with CRNA.                   Emmanuel Freedman MD   9/11/2018

## 2018-09-11 NOTE — H&P
[facility]  History and Physical Update    Pt Name: Masoud Mcrae  MRN: [id number]  YOB: 1965  Date of evaluation: 9/11/2018      I have examined the patient and reviewed the H&P/Consult and there are no changes to the patient or plans.         Electronically signed by Liv Caldera MD on 9/11/2018 at 8:31 AM

## 2018-09-11 NOTE — PROGRESS NOTES
1801:  Pt arrival to phase II recovery, VSS, POx 96% RA. Pt denies pain/nausea. Pt given drink/snack, family to bedside. Pt resting, call light in reach. Side rails up x 2.      0945:  Pt & family educated on d/c instructions, both verbalize understanding. All belongings returned to pt. Pt dressing for d/c.    9085:  Pt escorted to passenger side of car, family driving her home. Pt d/c in stable condition.

## 2018-10-15 ENCOUNTER — OFFICE VISIT (OUTPATIENT)
Dept: PHYSICAL MEDICINE AND REHAB | Age: 53
End: 2018-10-15
Payer: MEDICARE

## 2018-10-15 VITALS
WEIGHT: 211 LBS | SYSTOLIC BLOOD PRESSURE: 157 MMHG | BODY MASS INDEX: 36.02 KG/M2 | HEART RATE: 59 BPM | HEIGHT: 64 IN | DIASTOLIC BLOOD PRESSURE: 89 MMHG

## 2018-10-15 DIAGNOSIS — M54.16 LUMBAR RADICULOPATHY: Primary | ICD-10-CM

## 2018-10-15 DIAGNOSIS — M54.50 LUMBAR PAIN: ICD-10-CM

## 2018-10-15 DIAGNOSIS — M96.1 POST LAMINECTOMY SYNDROME: ICD-10-CM

## 2018-10-15 DIAGNOSIS — G89.4 CHRONIC PAIN SYNDROME: ICD-10-CM

## 2018-10-15 DIAGNOSIS — M47.816 LUMBAR SPONDYLOSIS: ICD-10-CM

## 2018-10-15 PROCEDURE — 99213 OFFICE O/P EST LOW 20 MIN: CPT | Performed by: NURSE PRACTITIONER

## 2018-10-15 RX ORDER — TIZANIDINE 4 MG/1
4 TABLET ORAL EVERY 8 HOURS PRN
Qty: 90 TABLET | Refills: 0 | Status: SHIPPED | OUTPATIENT
Start: 2018-10-15

## 2018-10-15 ASSESSMENT — ENCOUNTER SYMPTOMS
BACK PAIN: 1
CHEST TIGHTNESS: 0
ABDOMINAL PAIN: 1
DIARRHEA: 1
COLOR CHANGE: 0
CONSTIPATION: 1
SHORTNESS OF BREATH: 0
COUGH: 0

## 2018-10-26 ENCOUNTER — ANESTHESIA (OUTPATIENT)
Dept: OPERATING ROOM | Age: 53
End: 2018-10-26
Payer: MEDICARE

## 2018-10-26 ENCOUNTER — APPOINTMENT (OUTPATIENT)
Dept: GENERAL RADIOLOGY | Age: 53
End: 2018-10-26
Attending: PAIN MEDICINE
Payer: MEDICARE

## 2018-10-26 ENCOUNTER — HOSPITAL ENCOUNTER (OUTPATIENT)
Age: 53
Setting detail: OUTPATIENT SURGERY
Discharge: HOME | End: 2018-10-26
Attending: PAIN MEDICINE | Admitting: PAIN MEDICINE
Payer: MEDICARE

## 2018-10-26 ENCOUNTER — ANESTHESIA EVENT (OUTPATIENT)
Dept: OPERATING ROOM | Age: 53
End: 2018-10-26
Payer: MEDICARE

## 2018-10-26 VITALS
SYSTOLIC BLOOD PRESSURE: 106 MMHG | BODY MASS INDEX: 37.46 KG/M2 | RESPIRATION RATE: 15 BRPM | DIASTOLIC BLOOD PRESSURE: 59 MMHG | HEART RATE: 57 BPM | TEMPERATURE: 98 F | WEIGHT: 211.4 LBS | HEIGHT: 63 IN | OXYGEN SATURATION: 93 %

## 2018-10-26 VITALS
DIASTOLIC BLOOD PRESSURE: 81 MMHG | SYSTOLIC BLOOD PRESSURE: 131 MMHG | OXYGEN SATURATION: 96 % | RESPIRATION RATE: 7 BRPM

## 2018-10-26 LAB — GLUCOSE BLD-MCNC: 105 MG/DL (ref 70–108)

## 2018-10-26 PROCEDURE — 7100000010 HC PHASE II RECOVERY - FIRST 15 MIN: Performed by: PAIN MEDICINE

## 2018-10-26 PROCEDURE — 62323 NJX INTERLAMINAR LMBR/SAC: CPT | Performed by: PAIN MEDICINE

## 2018-10-26 PROCEDURE — 3600000054 HC PAIN LEVEL 3 BASE: Performed by: PAIN MEDICINE

## 2018-10-26 PROCEDURE — 3209999900 FLUORO FOR SURGICAL PROCEDURES

## 2018-10-26 PROCEDURE — 6360000002 HC RX W HCPCS: Performed by: NURSE ANESTHETIST, CERTIFIED REGISTERED

## 2018-10-26 PROCEDURE — 2580000003 HC RX 258: Performed by: PAIN MEDICINE

## 2018-10-26 PROCEDURE — 2500000003 HC RX 250 WO HCPCS: Performed by: NURSE ANESTHETIST, CERTIFIED REGISTERED

## 2018-10-26 PROCEDURE — 3700000000 HC ANESTHESIA ATTENDED CARE: Performed by: PAIN MEDICINE

## 2018-10-26 PROCEDURE — 82948 REAGENT STRIP/BLOOD GLUCOSE: CPT

## 2018-10-26 PROCEDURE — 2500000003 HC RX 250 WO HCPCS: Performed by: PAIN MEDICINE

## 2018-10-26 PROCEDURE — 6360000002 HC RX W HCPCS: Performed by: PAIN MEDICINE

## 2018-10-26 PROCEDURE — 7100000011 HC PHASE II RECOVERY - ADDTL 15 MIN: Performed by: PAIN MEDICINE

## 2018-10-26 PROCEDURE — 6360000004 HC RX CONTRAST MEDICATION: Performed by: PAIN MEDICINE

## 2018-10-26 PROCEDURE — 2709999900 HC NON-CHARGEABLE SUPPLY: Performed by: PAIN MEDICINE

## 2018-10-26 RX ORDER — FENTANYL CITRATE 50 UG/ML
INJECTION, SOLUTION INTRAMUSCULAR; INTRAVENOUS PRN
Status: DISCONTINUED | OUTPATIENT
Start: 2018-10-26 | End: 2018-10-26 | Stop reason: SDUPTHER

## 2018-10-26 RX ORDER — LIDOCAINE HYDROCHLORIDE 10 MG/ML
INJECTION, SOLUTION INFILTRATION; PERINEURAL PRN
Status: DISCONTINUED | OUTPATIENT
Start: 2018-10-26 | End: 2018-10-26 | Stop reason: HOSPADM

## 2018-10-26 RX ORDER — LIDOCAINE HYDROCHLORIDE 20 MG/ML
INJECTION, SOLUTION INFILTRATION; PERINEURAL PRN
Status: DISCONTINUED | OUTPATIENT
Start: 2018-10-26 | End: 2018-10-26 | Stop reason: SDUPTHER

## 2018-10-26 RX ORDER — DEXAMETHASONE SODIUM PHOSPHATE 4 MG/ML
INJECTION, SOLUTION INTRA-ARTICULAR; INTRALESIONAL; INTRAMUSCULAR; INTRAVENOUS; SOFT TISSUE PRN
Status: DISCONTINUED | OUTPATIENT
Start: 2018-10-26 | End: 2018-10-26 | Stop reason: HOSPADM

## 2018-10-26 RX ORDER — PROPOFOL 10 MG/ML
INJECTION, EMULSION INTRAVENOUS PRN
Status: DISCONTINUED | OUTPATIENT
Start: 2018-10-26 | End: 2018-10-26 | Stop reason: SDUPTHER

## 2018-10-26 RX ORDER — 0.9 % SODIUM CHLORIDE 0.9 %
VIAL (ML) INJECTION PRN
Status: DISCONTINUED | OUTPATIENT
Start: 2018-10-26 | End: 2018-10-26 | Stop reason: HOSPADM

## 2018-10-26 RX ADMIN — LIDOCAINE HYDROCHLORIDE 40 MG: 20 INJECTION, SOLUTION INFILTRATION; PERINEURAL at 09:25

## 2018-10-26 RX ADMIN — FENTANYL CITRATE 50 MCG: 50 INJECTION INTRAMUSCULAR; INTRAVENOUS at 09:24

## 2018-10-26 RX ADMIN — PROPOFOL 50 MG: 10 INJECTION, EMULSION INTRAVENOUS at 09:26

## 2018-10-26 RX ADMIN — FENTANYL CITRATE 50 MCG: 50 INJECTION INTRAMUSCULAR; INTRAVENOUS at 09:27

## 2018-10-26 ASSESSMENT — PULMONARY FUNCTION TESTS
PIF_VALUE: 0

## 2018-10-26 ASSESSMENT — PAIN SCALES - GENERAL: PAINLEVEL_OUTOF10: 0

## 2018-10-26 ASSESSMENT — LIFESTYLE VARIABLES: SMOKING_STATUS: 1

## 2018-10-26 ASSESSMENT — PAIN - FUNCTIONAL ASSESSMENT: PAIN_FUNCTIONAL_ASSESSMENT: 0-10

## 2018-10-26 NOTE — ANESTHESIA POSTPROCEDURE EVALUATION
Department of Anesthesiology  Postprocedure Note    Patient: Mariela Mckeon  MRN: [id number]  YOB: 1965  Date of evaluation: 10/26/2018  Time:  10:43 AM     Procedure Summary     Date:  10/26/18 Room / Location:  [facility] OR [facility] Radu Kellogg    Anesthesia Start:  4665 Anesthesia Stop:  0799    Procedure:  LESI #2 at L1 (N/A ) Diagnosis:  (LUMBAR RADICULITIS)    Surgeon:  Asad Smith MD Responsible Provider:  Hina Miranda MD    Anesthesia Type:  MAC ASA Status:  3          Anesthesia Type: MAC    Vlad Phase I:      Vlad Phase II: Vlad Score: 10    Last vitals: Reviewed and per EMR flowsheets.        Anesthesia Post Evaluation    Nausea & Vomiting: no nausea

## 2018-10-26 NOTE — H&P
[facility]  History and Physical Update    Pt Name: Guicho Ramírez  MRN: [id number]  YOB: 1965  Date of evaluation: 10/26/2018      I have examined the patient and reviewed the H&P/Consult and there are no changes to the patient or plans.         Electronically signed by Madelin Araujo MD on 10/26/2018 at 8:59 AM

## 2018-10-26 NOTE — ANESTHESIA PRE PROCEDURE
Department of Anesthesiology  Preprocedure Note       Name:  Jay Emmanuel   Age:  48 y.o.  :  1965                                          MRN:  [id number]         Date:  10/26/2018      Surgeon: Ravinder Corona):  Meredith Malagon MD    Procedure: Procedure(s):  LESI  L1 OR OTHER POSSIBLE LEVEL    Medications prior to admission:   Prior to Admission medications    Medication Sig Start Date End Date Taking? Authorizing Provider   tiZANidine (ZANAFLEX) 4 MG tablet Take 1 tablet by mouth every 8 hours as needed (muscle spasms) 10/15/18   Patti Wilson APRN - CNP   losartan-hydrochlorothiazide (HYZAAR) 100-25 MG per tablet Take 1 tablet by mouth daily. Historical Provider, MD   levothyroxine (SYNTHROID) 175 MCG tablet Take 175 mcg by mouth Daily. Historical Provider, MD   gabapentin (NEURONTIN) 400 MG capsule Take 400 mg by mouth 3 times daily. Historical Provider, MD   citalopram (CELEXA) 40 MG tablet Take 40 mg by mouth daily. Historical Provider, MD   metoprolol (TOPROL-XL) 50 MG XL tablet Take 50 mg by mouth daily. Historical Provider, MD   buPROPion SR (WELLBUTRIN SR) 150 MG SR tablet Take 150 mg by mouth daily. Historical Provider, MD   simvastatin (ZOCOR) 20 MG tablet Take 20 mg by mouth daily. Historical Provider, MD   aspirin 81 MG tablet Take 81 mg by mouth daily. Historical Provider, MD       Current medications:    No current outpatient prescriptions on file. No current facility-administered medications for this visit. Allergies:     Allergies   Allergen Reactions    Other      Cipro and Toradol together caused kidney problems     Penicillins Swelling    Sulfa Antibiotics Swelling       Problem List:    Patient Active Problem List   Diagnosis Code    Spinal stenosis, lumbar region, with neurogenic claudication M48.062    Lumbar radiculopathy M54.16    Lumbar post-laminectomy syndrome M96.1       Past Medical History:        Diagnosis Date    Arthritis

## 2018-10-31 ENCOUNTER — TELEPHONE (OUTPATIENT)
Dept: PHYSICAL MEDICINE AND REHAB | Age: 53
End: 2018-10-31

## 2018-12-06 ENCOUNTER — OFFICE VISIT (OUTPATIENT)
Dept: PHYSICAL MEDICINE AND REHAB | Age: 53
End: 2018-12-06
Payer: MEDICARE

## 2018-12-06 VITALS
HEART RATE: 63 BPM | BODY MASS INDEX: 37.39 KG/M2 | DIASTOLIC BLOOD PRESSURE: 90 MMHG | SYSTOLIC BLOOD PRESSURE: 137 MMHG | HEIGHT: 63 IN | WEIGHT: 211 LBS

## 2018-12-06 DIAGNOSIS — M62.838 SPASM OF MUSCLE: ICD-10-CM

## 2018-12-06 DIAGNOSIS — M96.1 POST LAMINECTOMY SYNDROME: ICD-10-CM

## 2018-12-06 DIAGNOSIS — M54.16 LUMBAR RADICULOPATHY: Primary | ICD-10-CM

## 2018-12-06 DIAGNOSIS — M54.50 LUMBAR PAIN: ICD-10-CM

## 2018-12-06 DIAGNOSIS — M47.816 LUMBAR SPONDYLOSIS: ICD-10-CM

## 2018-12-06 DIAGNOSIS — G89.4 CHRONIC PAIN SYNDROME: ICD-10-CM

## 2018-12-06 PROCEDURE — 99213 OFFICE O/P EST LOW 20 MIN: CPT | Performed by: NURSE PRACTITIONER

## 2018-12-06 RX ORDER — TIZANIDINE 2 MG/1
6 TABLET ORAL EVERY 8 HOURS PRN
Qty: 90 TABLET | Refills: 0 | Status: SHIPPED | OUTPATIENT
Start: 2018-12-06

## 2018-12-06 RX ORDER — GABAPENTIN 600 MG/1
600 TABLET ORAL 3 TIMES DAILY
Qty: 42 TABLET | Refills: 0 | Status: SHIPPED
Start: 2018-12-06 | End: 2020-03-05 | Stop reason: DRUGHIGH

## 2018-12-06 ASSESSMENT — ENCOUNTER SYMPTOMS
BACK PAIN: 1
DIARRHEA: 1
CHEST TIGHTNESS: 0
CONSTIPATION: 1
SHORTNESS OF BREATH: 0
ABDOMINAL PAIN: 1
COUGH: 0
COLOR CHANGE: 0

## 2018-12-17 ENCOUNTER — HOSPITAL ENCOUNTER (OUTPATIENT)
Dept: GENERAL RADIOLOGY | Age: 53
Discharge: HOME | End: 2018-12-17
Payer: MEDICARE

## 2018-12-17 ENCOUNTER — HOSPITAL ENCOUNTER (OUTPATIENT)
Dept: CT IMAGING | Age: 53
Discharge: HOME | End: 2018-12-17
Payer: MEDICARE

## 2018-12-17 VITALS
HEART RATE: 55 BPM | OXYGEN SATURATION: 94 % | SYSTOLIC BLOOD PRESSURE: 143 MMHG | TEMPERATURE: 98.2 F | RESPIRATION RATE: 18 BRPM | DIASTOLIC BLOOD PRESSURE: 96 MMHG

## 2018-12-17 DIAGNOSIS — M54.16 LUMBAR RADICULOPATHY: ICD-10-CM

## 2018-12-17 DIAGNOSIS — M96.1 POST LAMINECTOMY SYNDROME: ICD-10-CM

## 2018-12-17 DIAGNOSIS — M54.50 LUMBAR PAIN: ICD-10-CM

## 2018-12-17 PROCEDURE — 6360000004 HC RX CONTRAST MEDICATION: Performed by: NURSE PRACTITIONER

## 2018-12-17 PROCEDURE — 62304 MYELOGRAPHY LUMBAR INJECTION: CPT

## 2018-12-17 PROCEDURE — 2709999900 HC NON-CHARGEABLE SUPPLY

## 2018-12-17 PROCEDURE — 72132 CT LUMBAR SPINE W/DYE: CPT

## 2018-12-17 PROCEDURE — 2580000003 HC RX 258: Performed by: RADIOLOGY

## 2018-12-17 RX ORDER — SODIUM CHLORIDE 450 MG/100ML
INJECTION, SOLUTION INTRAVENOUS CONTINUOUS
Status: DISCONTINUED | OUTPATIENT
Start: 2018-12-17 | End: 2018-12-18 | Stop reason: HOSPADM

## 2018-12-17 RX ORDER — ONDANSETRON 2 MG/ML
4 INJECTION INTRAMUSCULAR; INTRAVENOUS EVERY 6 HOURS PRN
Status: DISCONTINUED | OUTPATIENT
Start: 2018-12-17 | End: 2018-12-18 | Stop reason: HOSPADM

## 2018-12-17 RX ADMIN — IOHEXOL 20 ML: 180 INJECTION INTRAVENOUS at 08:50

## 2018-12-17 RX ADMIN — SODIUM CHLORIDE: 4.5 INJECTION, SOLUTION INTRAVENOUS at 07:51

## 2018-12-17 ASSESSMENT — PAIN SCALES - GENERAL: PAINLEVEL_OUTOF10: 6

## 2018-12-17 ASSESSMENT — PAIN DESCRIPTION - LOCATION: LOCATION: BACK

## 2018-12-17 ASSESSMENT — PAIN DESCRIPTION - ORIENTATION: ORIENTATION: LOWER

## 2018-12-17 ASSESSMENT — PAIN DESCRIPTION - PAIN TYPE: TYPE: CHRONIC PAIN

## 2018-12-17 NOTE — PROGRESS NOTES
0730 pt arrives ambulatory with spouse for myelogram. Procedure explained and questions answered. PT RIGHTS AND RESPONSIBILITIES OFFERED TO PT. Pt denies taking aspirin for 5 days and wellbutrin, celexa and tramadol for 3 days. Pt states she has numbness in left leg.   0825 pt to fluoro  1000 pt back from fluoro. Pt ambulated to restroom appropriately. Pt provided with coffee. Pt denies headache. Pt provided with myelogram disc to give to ordering physician at next appointment. Pt verbalized understanding. 1115 vitals stable. Pt ambulating appropriately without difficulty. Pt discharged ambulatory with spouse with instructions with no complaints. Vitals stable. bandaid unchanged. Pt denies new numbness.                       _m___ Safety:       (Environmental)   Alsip to environment   Ensure ID band is correct and in place/ allergy band as needed   Assess for fall risk   Initiate fall precautions as applicable (fall band, side rails, etc.)   Call light within reach   Bed in low position/ wheels locked    _m___ Pain:        Assess pain level and characteristics   Administer analgesics as ordered   Assess effectiveness of pain management and report to MD as needed    __m__ Knowledge Deficit:   Assess baseline knowledge   Provide teaching at level of understanding   Provide teaching via preferred learning method   Evaluate teaching effectiveness    __m__ Hemodynamic/Respiratory Status:       (Pre and Post Procedure Monitoring)   Assess/Monitor vital signs and LOC   Assess Baseline SpO2 prior to any sedation   Obtain weight/height   Assess vital signs/ LOC until patient meets discharge criteria   Monitor procedure site and notify MD of any issues

## 2018-12-26 ENCOUNTER — OFFICE VISIT (OUTPATIENT)
Dept: PHYSICAL MEDICINE AND REHAB | Age: 53
End: 2018-12-26
Payer: MEDICARE

## 2018-12-26 VITALS
BODY MASS INDEX: 37.39 KG/M2 | SYSTOLIC BLOOD PRESSURE: 138 MMHG | HEART RATE: 60 BPM | WEIGHT: 211 LBS | HEIGHT: 63 IN | DIASTOLIC BLOOD PRESSURE: 79 MMHG

## 2018-12-26 DIAGNOSIS — M62.838 SPASM OF MUSCLE: ICD-10-CM

## 2018-12-26 DIAGNOSIS — M79.18 MYOFASCIAL MUSCLE PAIN: Primary | ICD-10-CM

## 2018-12-26 PROCEDURE — 20553 NJX 1/MLT TRIGGER POINTS 3/>: CPT | Performed by: NURSE PRACTITIONER

## 2019-01-28 ENCOUNTER — OFFICE VISIT (OUTPATIENT)
Dept: PHYSICAL MEDICINE AND REHAB | Age: 54
End: 2019-01-28
Payer: MEDICARE

## 2019-01-28 VITALS
SYSTOLIC BLOOD PRESSURE: 134 MMHG | DIASTOLIC BLOOD PRESSURE: 86 MMHG | HEART RATE: 59 BPM | WEIGHT: 211 LBS | HEIGHT: 63 IN | BODY MASS INDEX: 37.39 KG/M2

## 2019-01-28 DIAGNOSIS — M54.50 LUMBAR PAIN: ICD-10-CM

## 2019-01-28 DIAGNOSIS — M62.838 SPASM OF MUSCLE: ICD-10-CM

## 2019-01-28 DIAGNOSIS — M48.062 LUMBAR STENOSIS WITH NEUROGENIC CLAUDICATION: ICD-10-CM

## 2019-01-28 DIAGNOSIS — M54.16 LUMBAR RADICULOPATHY: Primary | ICD-10-CM

## 2019-01-28 DIAGNOSIS — G89.4 CHRONIC PAIN SYNDROME: ICD-10-CM

## 2019-01-28 DIAGNOSIS — M47.816 LUMBAR SPONDYLOSIS: ICD-10-CM

## 2019-01-28 PROCEDURE — 99213 OFFICE O/P EST LOW 20 MIN: CPT | Performed by: NURSE PRACTITIONER

## 2019-01-28 RX ORDER — GABAPENTIN 600 MG/1
600 TABLET ORAL 3 TIMES DAILY
Qty: 90 TABLET | Refills: 0 | Status: SHIPPED
Start: 2019-01-28 | End: 2020-03-05 | Stop reason: DRUGHIGH

## 2019-01-28 RX ORDER — ATORVASTATIN CALCIUM 40 MG/1
40 TABLET, FILM COATED ORAL DAILY
COMMUNITY
End: 2020-02-19

## 2019-01-28 ASSESSMENT — ENCOUNTER SYMPTOMS
COUGH: 0
DIARRHEA: 1
CONSTIPATION: 1
ABDOMINAL PAIN: 1
SHORTNESS OF BREATH: 0
CHEST TIGHTNESS: 0
BACK PAIN: 1
COLOR CHANGE: 0

## 2019-02-04 ENCOUNTER — PREP FOR PROCEDURE (OUTPATIENT)
Dept: PHYSICAL MEDICINE AND REHAB | Age: 54
End: 2019-02-04

## 2019-02-12 ENCOUNTER — TELEPHONE (OUTPATIENT)
Dept: PHYSICAL MEDICINE AND REHAB | Age: 54
End: 2019-02-12

## 2020-01-17 ENCOUNTER — HOSPITAL ENCOUNTER (OUTPATIENT)
Dept: GENERAL RADIOLOGY | Age: 55
Discharge: HOME | End: 2020-01-17
Payer: MEDICARE

## 2020-01-17 ENCOUNTER — HOSPITAL ENCOUNTER (OUTPATIENT)
Age: 55
Discharge: HOME | End: 2020-01-17
Payer: MEDICARE

## 2020-01-17 PROCEDURE — 72220 X-RAY EXAM SACRUM TAILBONE: CPT

## 2020-01-30 ENCOUNTER — OFFICE VISIT (OUTPATIENT)
Dept: PHYSICAL MEDICINE AND REHAB | Age: 55
End: 2020-01-30
Payer: MEDICARE

## 2020-01-30 VITALS
DIASTOLIC BLOOD PRESSURE: 78 MMHG | HEIGHT: 63 IN | SYSTOLIC BLOOD PRESSURE: 128 MMHG | BODY MASS INDEX: 37.38 KG/M2 | WEIGHT: 210.98 LBS

## 2020-01-30 PROCEDURE — 4004F PT TOBACCO SCREEN RCVD TLK: CPT | Performed by: NURSE PRACTITIONER

## 2020-01-30 PROCEDURE — 99214 OFFICE O/P EST MOD 30 MIN: CPT | Performed by: NURSE PRACTITIONER

## 2020-01-30 PROCEDURE — G8427 DOCREV CUR MEDS BY ELIG CLIN: HCPCS | Performed by: NURSE PRACTITIONER

## 2020-01-30 PROCEDURE — G8419 CALC BMI OUT NRM PARAM NOF/U: HCPCS | Performed by: NURSE PRACTITIONER

## 2020-01-30 PROCEDURE — G8484 FLU IMMUNIZE NO ADMIN: HCPCS | Performed by: NURSE PRACTITIONER

## 2020-01-30 PROCEDURE — 3017F COLORECTAL CA SCREEN DOC REV: CPT | Performed by: NURSE PRACTITIONER

## 2020-01-30 RX ORDER — ROSUVASTATIN CALCIUM 20 MG/1
40 TABLET, COATED ORAL
COMMUNITY
Start: 2020-01-09

## 2020-01-30 RX ORDER — GABAPENTIN 300 MG/1
300 CAPSULE ORAL NIGHTLY
Qty: 30 CAPSULE | Refills: 0 | Status: SHIPPED
Start: 2020-01-30 | End: 2020-03-05 | Stop reason: DRUGHIGH

## 2020-01-30 ASSESSMENT — ENCOUNTER SYMPTOMS
COUGH: 0
CHEST TIGHTNESS: 0
CONSTIPATION: 1
SHORTNESS OF BREATH: 0
BACK PAIN: 1
DIARRHEA: 1
ABDOMINAL PAIN: 1
COLOR CHANGE: 0

## 2020-01-30 NOTE — PROGRESS NOTES
Extensive surgery within the distal lumbar spine.       Sacrum shows no definite fracture. SI joints show mild subchondral cystic change without diastases.                   Impression       There is subchondral cystic change along each SI joint as well as subtle sclerosis and minimal spur. PROCEDURE: CT LUMBAR SPINE W CONTRAST       CLINICAL INFORMATION: Lumbar radiculopathy. Low back pain, left leg pain       COMPARISON: MRI 8/8/2018, myelogram 12/17/2018       TECHNIQUE: 3 mm noncontrast axial images were obtained of the lumbar spine. Sagittal and coronal reconstructions were obtained. Angled images were reconstructed through the L3-4, L4-5 and L5-S1 disc levels.       Injection was performed by Dr. Marcus Dickerson.       All CT scans at this facility use dose modulation, iterative reconstruction, and/or weight-based dosing when appropriate to reduce radiation dose to as low as reasonably achievable.       FINDINGS:       Posterior fusion extends from L2 to S1. No acute fracture. Marked sclerosis surrounds the L1-L2 disc space. Conus appears normal.       Paravertebral soft tissues appear normal.       On the axial images, at T11-T12, partially calcified disc bulge causes mild thecal sac effacement.       At T12-L1, unremarkable.       L1-L2, moderate disc space narrowing. Moderate vacuum formation. A broad disc protrusion has left preforaminal asymmetry and appears to subtly flatten the left L2 nerve root. Left neural foramen is moderately narrowed. There is subtle retrolisthesis.       At L2-3, bilateral laminectomies. Mild retrolisthesis. No gross disc herniation or significant foraminal narrowing.       At L3-4, bilateral laminectomies. No gross disc herniation or high-grade foraminal narrowing.       At L4-5, bilateral laminectomies. No gross disc herniation or significant foraminal narrowing.       At L5-S1, bilateral laminectomies. No gross disc herniation.  Spurring and probable scarring appear to moderately narrow each neural foramen.               Impression       Subtle neural effacement toward the left at L1-L2. Potentially significant foraminal narrowing at L5-S1. Contrast MRI would best assess this. The patientis allergic to other; penicillins; and sulfa antibiotics. Past Medical History  Adolphus Sicard  has a past medical history of Arthritis, Diabetes mellitus ([address] GERD (gastroesophageal reflux disease), Hyperlipidemia, Hypertension, and Thyroid disease. Past Surgical History  The patient  has a past surgical history that includes Cholecystectomy; Hysterectomy; Appendectomy; Tonsillectomy; Colonoscopy; Lumbar spine surgery (2016); Cervical spine surgery; Foot surgery (Right); lumbar laminectomy (1-29-14); pr njx dx/ther sbst intrlmnr lmbr/sac w/img gdn (N/A, 9/11/2018); and pr njx dx/ther sbst intrlmnr lmbr/sac w/img gdn (N/A, 10/26/2018). Family History  This patient's family history includes Cancer in her father and sister; Diabetes in her brother, father, and mother; Heart Disease in her brother, father, and mother; High Blood Pressure in her father and mother; Stroke in her father. Social History  Adolphus Sicard  reports that she has been smoking. She has a 6.00 pack-year smoking history. She has never used smokeless tobacco. She reports current drug use. Drug: Marijuana. She reports that she does not drink alcohol. Medications    Current Outpatient Medications:     rosuvastatin (CRESTOR) 20 MG tablet, TAKE 1 TABLET BY MOUTH ONCE DAILY FOR 90 DAYS, Disp: , Rfl:     gabapentin (NEURONTIN) 300 MG capsule, Take 1 capsule by mouth nightly for 30 days.  Intended supply: 30 days, Disp: 30 capsule, Rfl: 0    atorvastatin (LIPITOR) 40 MG tablet, Take 40 mg by mouth daily, Disp: , Rfl:     tiZANidine (ZANAFLEX) 2 MG tablet, Take 3 tablets by mouth every 8 hours as needed (muscle spasms), Disp: 90 tablet, Rfl: 0    tiZANidine (ZANAFLEX) 4 MG tablet, Take 1 tablet by mouth every 8 hours as needed (muscle spasms), Disp: 90 tablet, Rfl: 0    losartan-hydrochlorothiazide (HYZAAR) 100-25 MG per tablet, Take 1 tablet by mouth daily. , Disp: , Rfl:     levothyroxine (SYNTHROID) 175 MCG tablet, Take 175 mcg by mouth Daily. , Disp: , Rfl:     citalopram (CELEXA) 40 MG tablet, Take 40 mg by mouth daily. , Disp: , Rfl:     metoprolol (TOPROL-XL) 50 MG XL tablet, Take 50 mg by mouth daily. , Disp: , Rfl:     buPROPion SR (WELLBUTRIN SR) 150 MG SR tablet, Take 150 mg by mouth daily. , Disp: , Rfl:     aspirin 81 MG tablet, Take 81 mg by mouth daily. , Disp: , Rfl:     gabapentin (NEURONTIN) 600 MG tablet, Take 1 tablet by mouth 3 times daily for 30 days. ., Disp: 90 tablet, Rfl: 0    gabapentin (NEURONTIN) 600 MG tablet, Take 1 tablet by mouth 3 times daily for 14 days. ., Disp: 42 tablet, Rfl: 0    Subjective:      Review of Systems   Constitutional: Positive for activity change and fatigue. Negative for chills, diaphoresis and fever. Respiratory: Negative for cough, chest tightness and shortness of breath. Cardiovascular: Negative for chest pain and palpitations. Gastrointestinal: Positive for abdominal pain, constipation and diarrhea. Ulcerative colitis   Genitourinary: Positive for urgency. Negative for difficulty urinating, dysuria and frequency. Musculoskeletal: Positive for arthralgias, back pain, gait problem and myalgias. Skin: Negative for color change, rash and wound. Neurological: Positive for numbness and headaches. Negative for dizziness, seizures and light-headedness. Hematological: Does not bruise/bleed easily. Psychiatric/Behavioral: Positive for sleep disturbance. The patient is nervous/anxious. Objective:     Vitals:    01/30/20 0827   BP: 128/78   Site: Left Upper Arm   Position: Sitting   Cuff Size: Medium Adult   Weight: 210 lb 15.7 oz (95.7 kg)   Height: 5' 2.99\" (1.6 m)       Physical Exam  Constitutional:       General: She is not in acute distress. understanding with this and that may not be pain free   Discussed with patient about safe storage of medications at home   Discussed possible weaning of medication dosing dependent on treatment/procedure results.  Testing: none   Procedures: TFESI L5 LEFT #1   Discussed with patient about risks with procedure including infection, reaction to medication, increased pain, or bleeding.  Medications: continue to take Zanaflex PRN for pain. Gabapentin 300mg nightly    Patient states she has quit THC end of November. Meds. Prescribed:   Orders Placed This Encounter   Medications    gabapentin (NEURONTIN) 300 MG capsule     Sig: Take 1 capsule by mouth nightly for 30 days. Intended supply: 30 days     Dispense:  30 capsule     Refill:  0       Return for TFESI L5 LEFT #1, follow up after procedure.          Electronically signed by FAUSTINA Angeles CNP on1/30/2020 at 8:48 AM

## 2020-02-06 ENCOUNTER — PREP FOR PROCEDURE (OUTPATIENT)
Dept: PHYSICAL MEDICINE AND REHAB | Age: 55
End: 2020-02-06

## 2020-02-06 NOTE — H&P (VIEW-ONLY)
gabapentin (NEURONTIN) 300 MG capsule, Take 1 capsule by mouth nightly for 30 days. Intended supply: 30 days, Disp: 30 capsule, Rfl: 0    atorvastatin (LIPITOR) 40 MG tablet, Take 40 mg by mouth daily, Disp: , Rfl:     tiZANidine (ZANAFLEX) 2 MG tablet, Take 3 tablets by mouth every 8 hours as needed (muscle spasms), Disp: 90 tablet, Rfl: 0    tiZANidine (ZANAFLEX) 4 MG tablet, Take 1 tablet by mouth every 8 hours as needed (muscle spasms), Disp: 90 tablet, Rfl: 0    losartan-hydrochlorothiazide (HYZAAR) 100-25 MG per tablet, Take 1 tablet by mouth daily. , Disp: , Rfl:     levothyroxine (SYNTHROID) 175 MCG tablet, Take 175 mcg by mouth Daily. , Disp: , Rfl:     citalopram (CELEXA) 40 MG tablet, Take 40 mg by mouth daily. , Disp: , Rfl:     metoprolol (TOPROL-XL) 50 MG XL tablet, Take 50 mg by mouth daily. , Disp: , Rfl:     buPROPion SR (WELLBUTRIN SR) 150 MG SR tablet, Take 150 mg by mouth daily. , Disp: , Rfl:     aspirin 81 MG tablet, Take 81 mg by mouth daily. , Disp: , Rfl:     gabapentin (NEURONTIN) 600 MG tablet, Take 1 tablet by mouth 3 times daily for 30 days. ., Disp: 90 tablet, Rfl: 0    gabapentin (NEURONTIN) 600 MG tablet, Take 1 tablet by mouth 3 times daily for 14 days. ., Disp: 42 tablet, Rfl: 0        Subjective:      Review of Systems   Constitutional: Positive for activity change and fatigue. Negative for chills, diaphoresis and fever. Respiratory: Negative for cough, chest tightness and shortness of breath. Cardiovascular: Negative for chest pain and palpitations. Gastrointestinal: Positive for abdominal pain, constipation and diarrhea. Ulcerative colitis   Genitourinary: Positive for urgency. Negative for difficulty urinating, dysuria and frequency. Musculoskeletal: Positive for arthralgias, back pain, gait problem and myalgias. Skin: Negative for color change, rash and wound. Neurological: Positive for numbness and headaches.  Negative for dizziness, seizures and light-headedness. Hematological: Does not bruise/bleed easily. Psychiatric/Behavioral: Positive for sleep disturbance. The patient is nervous/anxious.          Objective:      Vitals                          Weight: 210 lb 15.7 oz (95.7 kg)   Height: 5' 2.99\" (1.6 m)            Physical Exam  Constitutional:       General: She is not in acute distress. Appearance: She is well-developed. She is not diaphoretic. HENT:      Head: Normocephalic and atraumatic. Eyes:      General:         Right eye: No discharge. Left eye: No discharge. Neck:      Musculoskeletal: Normal range of motion and neck supple. Trachea: No tracheal deviation. Pulmonary:      Effort: Pulmonary effort is normal. No respiratory distress. Abdominal:      General: There is no distension. Musculoskeletal:         General: Tenderness present. Right hip: She exhibits normal range of motion and normal strength. Left hip: She exhibits tenderness. She exhibits normal range of motion and normal strength. Cervical back: She exhibits normal range of motion and no tenderness. Thoracic back: She exhibits tenderness. Lumbar back: She exhibits decreased range of motion, tenderness, pain and spasm. Back:         Legs:    Skin:     General: Skin is warm. Coloration: Skin is not pale. Findings: No erythema or rash. Neurological:      Mental Status: She is alert and oriented to person, place, and time. Cranial Nerves: No cranial nerve deficit. Psychiatric:         Speech: Speech normal.         Behavior: Behavior normal.         Thought Content: Thought content normal.         Judgment: Judgment normal.            Assessment:      1. Lumbar stenosis with neurogenic claudication    2. Lumbar radiculopathy    3. Lumbar pain    4. Lumbar spondylosis    5.  Spasm of muscle          Plan:  TFESI L5 LEFT #1       FAUSTINA Quinn - AARON  2/6/2020

## 2020-02-06 NOTE — H&P
light-headedness. Hematological: Does not bruise/bleed easily. Psychiatric/Behavioral: Positive for sleep disturbance. The patient is nervous/anxious.          Objective:      Vitals                          Weight: 210 lb 15.7 oz (95.7 kg)   Height: 5' 2.99\" (1.6 m)            Physical Exam  Constitutional:       General: She is not in acute distress. Appearance: She is well-developed. She is not diaphoretic. HENT:      Head: Normocephalic and atraumatic. Eyes:      General:         Right eye: No discharge. Left eye: No discharge. Neck:      Musculoskeletal: Normal range of motion and neck supple. Trachea: No tracheal deviation. Pulmonary:      Effort: Pulmonary effort is normal. No respiratory distress. Abdominal:      General: There is no distension. Musculoskeletal:         General: Tenderness present. Right hip: She exhibits normal range of motion and normal strength. Left hip: She exhibits tenderness. She exhibits normal range of motion and normal strength. Cervical back: She exhibits normal range of motion and no tenderness. Thoracic back: She exhibits tenderness. Lumbar back: She exhibits decreased range of motion, tenderness, pain and spasm. Back:         Legs:    Skin:     General: Skin is warm. Coloration: Skin is not pale. Findings: No erythema or rash. Neurological:      Mental Status: She is alert and oriented to person, place, and time. Cranial Nerves: No cranial nerve deficit. Psychiatric:         Speech: Speech normal.         Behavior: Behavior normal.         Thought Content: Thought content normal.         Judgment: Judgment normal.            Assessment:      1. Lumbar stenosis with neurogenic claudication    2. Lumbar radiculopathy    3. Lumbar pain    4. Lumbar spondylosis    5.  Spasm of muscle          Plan:  TFESI L5 LEFT #1       Ashley Rose, APRN - CNP  2/6/2020

## 2020-02-19 NOTE — PROGRESS NOTES
NPO after midnight  [employer] and drivers license  Wear comfortable clean clothing  Do not bring jewelry  Shower night before and morning of surgery with a liquid antibacterial soap  Bring list of medications with dosage and how often taken  Follow all instructions given by your physician   needed at discharge  Call [phone] for any questions

## 2020-02-20 ENCOUNTER — ANESTHESIA (OUTPATIENT)
Dept: OPERATING ROOM | Age: 55
End: 2020-02-20
Payer: MEDICARE

## 2020-02-20 ENCOUNTER — ANESTHESIA EVENT (OUTPATIENT)
Dept: OPERATING ROOM | Age: 55
End: 2020-02-20
Payer: MEDICARE

## 2020-02-20 ENCOUNTER — APPOINTMENT (OUTPATIENT)
Dept: GENERAL RADIOLOGY | Age: 55
End: 2020-02-20
Attending: PAIN MEDICINE
Payer: MEDICARE

## 2020-02-20 ENCOUNTER — HOSPITAL ENCOUNTER (OUTPATIENT)
Age: 55
Setting detail: OUTPATIENT SURGERY
Discharge: HOME | End: 2020-02-20
Attending: PAIN MEDICINE | Admitting: PAIN MEDICINE
Payer: MEDICARE

## 2020-02-20 VITALS
BODY MASS INDEX: 33.57 KG/M2 | SYSTOLIC BLOOD PRESSURE: 164 MMHG | HEIGHT: 64 IN | RESPIRATION RATE: 16 BRPM | WEIGHT: 196.6 LBS | TEMPERATURE: 97 F | HEART RATE: 52 BPM | DIASTOLIC BLOOD PRESSURE: 89 MMHG | OXYGEN SATURATION: 96 %

## 2020-02-20 VITALS
SYSTOLIC BLOOD PRESSURE: 158 MMHG | RESPIRATION RATE: 6 BRPM | DIASTOLIC BLOOD PRESSURE: 83 MMHG | OXYGEN SATURATION: 99 %

## 2020-02-20 PROCEDURE — 3700000000 HC ANESTHESIA ATTENDED CARE: Performed by: PAIN MEDICINE

## 2020-02-20 PROCEDURE — 6360000004 HC RX CONTRAST MEDICATION: Performed by: PAIN MEDICINE

## 2020-02-20 PROCEDURE — 3600000054 HC PAIN LEVEL 3 BASE: Performed by: PAIN MEDICINE

## 2020-02-20 PROCEDURE — 2709999900 HC NON-CHARGEABLE SUPPLY: Performed by: PAIN MEDICINE

## 2020-02-20 PROCEDURE — 6360000002 HC RX W HCPCS: Performed by: PAIN MEDICINE

## 2020-02-20 PROCEDURE — 2500000003 HC RX 250 WO HCPCS: Performed by: PAIN MEDICINE

## 2020-02-20 PROCEDURE — 7100000010 HC PHASE II RECOVERY - FIRST 15 MIN: Performed by: PAIN MEDICINE

## 2020-02-20 PROCEDURE — 7100000011 HC PHASE II RECOVERY - ADDTL 15 MIN: Performed by: PAIN MEDICINE

## 2020-02-20 PROCEDURE — 6360000002 HC RX W HCPCS: Performed by: REGISTERED NURSE

## 2020-02-20 PROCEDURE — 3209999900 FLUORO FOR SURGICAL PROCEDURES

## 2020-02-20 PROCEDURE — 64483 NJX AA&/STRD TFRM EPI L/S 1: CPT | Performed by: PAIN MEDICINE

## 2020-02-20 RX ORDER — DEXAMETHASONE SODIUM PHOSPHATE 4 MG/ML
INJECTION, SOLUTION INTRA-ARTICULAR; INTRALESIONAL; INTRAMUSCULAR; INTRAVENOUS; SOFT TISSUE PRN
Status: DISCONTINUED | OUTPATIENT
Start: 2020-02-20 | End: 2020-02-20 | Stop reason: ALTCHOICE

## 2020-02-20 RX ORDER — BUPIVACAINE HYDROCHLORIDE 2.5 MG/ML
INJECTION, SOLUTION EPIDURAL; INFILTRATION; INTRACAUDAL PRN
Status: DISCONTINUED | OUTPATIENT
Start: 2020-02-20 | End: 2020-02-20 | Stop reason: ALTCHOICE

## 2020-02-20 RX ORDER — FENTANYL CITRATE 50 UG/ML
INJECTION, SOLUTION INTRAMUSCULAR; INTRAVENOUS PRN
Status: DISCONTINUED | OUTPATIENT
Start: 2020-02-20 | End: 2020-02-20 | Stop reason: SDUPTHER

## 2020-02-20 RX ORDER — LIDOCAINE HYDROCHLORIDE 10 MG/ML
INJECTION, SOLUTION INFILTRATION; PERINEURAL PRN
Status: DISCONTINUED | OUTPATIENT
Start: 2020-02-20 | End: 2020-02-20 | Stop reason: ALTCHOICE

## 2020-02-20 RX ADMIN — FENTANYL CITRATE 100 MCG: 50 INJECTION, SOLUTION INTRAMUSCULAR; INTRAVENOUS at 13:45

## 2020-02-20 ASSESSMENT — LIFESTYLE VARIABLES: SMOKING_STATUS: 1

## 2020-02-20 ASSESSMENT — PAIN - FUNCTIONAL ASSESSMENT: PAIN_FUNCTIONAL_ASSESSMENT: 0-10

## 2020-02-20 NOTE — ANESTHESIA PRE PROCEDURE
Induction: intravenous. Anesthetic plan and risks discussed with patient. Plan discussed with CRNA.                   [address], DO   2/20/2020

## 2020-02-20 NOTE — INTERVAL H&P NOTE
H&P Update    Patient's History and Physical from February 6, 2020 was reviewed. Patient examined. There has been no change.     Electronically signed by Nestor Ortiz MD on 2/20/20 at 12:18 PM

## 2020-02-20 NOTE — PROGRESS NOTES
1354- Pt arrived to PACU phase 2. Pt hooked up to monitor, VSS, pt breathing deeply on room air. 1357- PT given snack spouse not in waiting room pt updated. 1414- Pt doing well  at bedside. Updated on plan of care. 1447- Pt devin up legs felt funny at first but better after had her bearings IV capped off pt getting dressed  1450- Pt given discharge instructions verbalized understanding. 1454- IV removed  1455- Pt discharged taken to vehicle with Juliane in wheelchair.

## 2020-02-20 NOTE — OP NOTE
Pre-Procedure Note    Patient Name: Baljeet Chahal   YOB: 1965  Medical Record Number: [id number]  Date: 2/20/20       Indication:  Lower back and Left leg pain    Consent: On file. Vital Signs:   Vitals:    02/20/20 1311   BP: (!) 153/82   Pulse: 59   Resp: 16   Temp: 98.3 °F (36.8 °C)   SpO2: 96%       Past Medical History:   has a past medical history of Arthritis, GERD (gastroesophageal reflux disease), Hyperlipidemia, Hypertension, and Thyroid disease. Past Surgical History:   has a past surgical history that includes Cholecystectomy; Hysterectomy; Appendectomy; Tonsillectomy; Colonoscopy; Lumbar spine surgery (2016); Cervical spine surgery; Foot surgery (Right); lumbar laminectomy (1-29-14); pr njx dx/ther sbst intrlmnr lmbr/sac w/img gdn (N/A, 9/11/2018); and pr njx dx/ther sbst intrlmnr lmbr/sac w/img gdn (N/A, 10/26/2018). Pre-Sedation Documentation and Exam:   Vital signs have been reviewed (see flow sheet for vitals). Sedation/ Anesthesia Plan:   MAC    Patient is an appropriate candidate for plan of sedation: yes    Preoperative Diagnosis:  L-radiculitis, L-post-laminectomy syndrome      Post-Op Dx: as above    Procedure Performed:  Transforaminal lumbar epidural steroid injection at the levels of L5 on the Left side under fluoroscopic guidance . Indication for the Procedure: The patient failed conservative management  for pain in the low back radiating to lower extremities. As the patient is not responding to conservative management and pain is interfering with activities of daily living, we decided to proceed with transforaminal lumbar epidural steroid injection as symptoms are mostly following the nerve root distribution. The procedure and the risks were discussed with the patient and informed consent was obtained. Procedure:     A meaningful communication was kept up with the patient throughout   the procedure. The patient is placed in prone position.   The skin over the back was prepped and draped in sterile manner. Then the skin and deep tissues just above the tip of the transverse process of L-5 vertebra on Left side were infiltrated with about 5 ml of 1% lidocaine. The #20-gauge, 3-1/2 inch Tuohy needle/#22-gauge, 5 inch spinal needle was inserted through the skin wheal  and under fluoroscopy guidance was directed such that the tip of the needle lies in the neuroforamina at about the 6 o'clock position under the pedicle of the L-5 vertebra. This was confirmed with AP and lateral views of the fluoroscopy. Then after negative aspiration a total of 1 ml of Omnipaque-180 was injected through the needle and spread of the contrast in the epidural space as well as along the nerve root was observed. Then after negative aspiration a total of 6 mg of Dexamethasone and 2 ml of 0.25%Marcaine MPF was injected through the needle. The needle is removed and a Band-Aid was placed over the needle  insertion site. EBL-0  The patient's vital signs remained stable and the patient tolerated the procedure well. The patient was discharged home in stable condition and will be followed in the pain clinic in the next few weeks for further planning.       Electronically signed by Jevon Benavides MD on 2/20/20 at 1:40 PM

## 2020-03-05 ENCOUNTER — OFFICE VISIT (OUTPATIENT)
Dept: PHYSICAL MEDICINE AND REHAB | Age: 55
End: 2020-03-05
Payer: MEDICARE

## 2020-03-05 VITALS
HEART RATE: 70 BPM | SYSTOLIC BLOOD PRESSURE: 152 MMHG | WEIGHT: 196.21 LBS | HEIGHT: 64 IN | BODY MASS INDEX: 33.5 KG/M2 | DIASTOLIC BLOOD PRESSURE: 94 MMHG

## 2020-03-05 PROCEDURE — 99213 OFFICE O/P EST LOW 20 MIN: CPT | Performed by: NURSE PRACTITIONER

## 2020-03-05 PROCEDURE — 3017F COLORECTAL CA SCREEN DOC REV: CPT | Performed by: NURSE PRACTITIONER

## 2020-03-05 PROCEDURE — G8417 CALC BMI ABV UP PARAM F/U: HCPCS | Performed by: NURSE PRACTITIONER

## 2020-03-05 PROCEDURE — 4004F PT TOBACCO SCREEN RCVD TLK: CPT | Performed by: NURSE PRACTITIONER

## 2020-03-05 PROCEDURE — G8484 FLU IMMUNIZE NO ADMIN: HCPCS | Performed by: NURSE PRACTITIONER

## 2020-03-05 PROCEDURE — G8427 DOCREV CUR MEDS BY ELIG CLIN: HCPCS | Performed by: NURSE PRACTITIONER

## 2020-03-05 RX ORDER — GABAPENTIN 300 MG/1
300 CAPSULE ORAL 3 TIMES DAILY
Qty: 90 CAPSULE | Refills: 0 | Status: SHIPPED | OUTPATIENT
Start: 2020-03-05 | End: 2020-04-04

## 2020-03-05 ASSESSMENT — ENCOUNTER SYMPTOMS
CHEST TIGHTNESS: 0
CONSTIPATION: 1
BACK PAIN: 1
ABDOMINAL PAIN: 1
COLOR CHANGE: 0
DIARRHEA: 1
SHORTNESS OF BREATH: 0
COUGH: 0

## 2020-03-05 NOTE — PROGRESS NOTES
[facility]  [address]  Dept: [phone]  Dept Fax: [phone]: [phone]    Visit Date: 3/5/2020    Functionality Assessment/Goals Worksheet     On a scale of 0 (Does not Interfere) to 10 (Completely Interferes)     1. Which number describes how during the past week pain has interfered with       the following:  A. General Activity:  7  B. Mood: 8  C. Walking Ability:  7  D. Normal Work (Includes both work outside the home and housework):  7  E. Relations with Other People:   5  F. Sleep:   8  G. Enjoyment of Life:   7    2. Patient Prefers to Take their Pain Medications:     []  On a regular basis   []  Only when necessary    [x]  Does not take pain medications    3. What are the Patient's Goals/Expectations for Visiting Pain Management? [x]  Learn about my pain    []  Receive Medication   []  Physical Therapy     []  Treat Depression   []  Receive Injections    []  Treat Sleep   []  Deal with Anxiety and Stress   []  Treat Opoid Dependence/Addiction   []  Other:      HPI:   Es Jackson is a 47 y.o. female is here today for    Chief Complaint: Low back pain and Left leg pain    HPI     Transforaminal lumbar epidural steroid injection at the levels of L5 on the Left side under fluoroscopic guidance with Dr Gualberto Hunter on 2/20/2020. Patient states for two days her pain was nearly gone and her leg was numb from the injection. Patient states that the coverage of the pain was good, just didn't last long enough. Patient states that she slept better. Patient states walking was an issues due to the numbness. Patient understanding reasoning for repeating, she is just unsure with the increased pain during the procedure. Discussed scheduling and working on medication titration in meantime, patient understanding. Patient with ongoing gabapentin at night.  States that she is tolerating well. Discussed increase 300 TID and continue to titrate. Medications reviewed. Drug screen reviewed from 1/30/2020 and was appropriate      The patientis allergic to other; penicillins; and sulfa antibiotics. Past Medical History  Chris Dubois  has a past medical history of Arthritis, GERD (gastroesophageal reflux disease), Hyperlipidemia, Hypertension, and Thyroid disease. Past Surgical History  The patient  has a past surgical history that includes Cholecystectomy; Hysterectomy; Appendectomy; Tonsillectomy; Colonoscopy; Lumbar spine surgery (2016); Cervical spine surgery; Foot surgery (Right); lumbar laminectomy (1-29-14); pr njx dx/ther sbst intrlmnr lmbr/sac w/img gdn (N/A, 9/11/2018); pr njx dx/ther sbst intrlmnr lmbr/sac w/img gdn (N/A, 10/26/2018); and Pain management procedure (Left, 2/20/2020). Family History  This patient's family history includes Cancer in her father and sister; Diabetes in her brother, father, and mother; Heart Disease in her brother, father, and mother; High Blood Pressure in her father and mother; Stroke in her father. Social History  Chris Dubois  reports that she has been smoking. She has a 6.00 pack-year smoking history. She has never used smokeless tobacco. She reports previous drug use. Drug: Marijuana. She reports that she does not drink alcohol. Medications    Current Outpatient Medications:     gabapentin (NEURONTIN) 300 MG capsule, Take 1 capsule by mouth 3 times daily for 30 days. , Disp: 90 capsule, Rfl: 0    rosuvastatin (CRESTOR) 20 MG tablet, 40 mg , Disp: , Rfl:     tiZANidine (ZANAFLEX) 2 MG tablet, Take 3 tablets by mouth every 8 hours as needed (muscle spasms), Disp: 90 tablet, Rfl: 0    tiZANidine (ZANAFLEX) 4 MG tablet, Take 1 tablet by mouth every 8 hours as needed (muscle spasms), Disp: 90 tablet, Rfl: 0    losartan-hydrochlorothiazide (HYZAAR) 100-25 MG per tablet, Take 1 tablet by mouth daily. , Disp: , Rfl:     levothyroxine increase gabapentin to 300 mg TID for 10 days then increase to 600mg TID. Patient to call for refills. Meds. Prescribed:   Orders Placed This Encounter   Medications    gabapentin (NEURONTIN) 300 MG capsule     Sig: Take 1 capsule by mouth 3 times daily for 30 days. Dispense:  90 capsule     Refill:  0       Return for TFESI L5 LEFT #2, follow up after procedure.          Electronically signed by FAUSTINA Mills CNP on3/5/2020 at 9:26 AM

## 2023-06-21 ENCOUNTER — HOSPITAL ENCOUNTER (OUTPATIENT)
Dept: PHYSICAL THERAPY | Age: 58
Setting detail: THERAPIES SERIES
Discharge: HOME | End: 2023-06-21
Payer: MEDICARE

## 2023-06-21 PROCEDURE — 97162 PT EVAL MOD COMPLEX 30 MIN: CPT

## 2023-06-21 PROCEDURE — 97110 THERAPEUTIC EXERCISES: CPT

## 2023-06-21 NOTE — PROGRESS NOTES
** PLEASE SIGN, DATE AND TIME CERTIFICATION BELOW AND RETURN TO [facility] OUTPATIENT REHABILITATION (FAX #: [phone]). ATTEST/CO-SIGN IF ACCESSING VIA [employer]. THANK YOU.**    I certify that I have examined the patient below and determined that Physical Medicine and Rehabilitation service is necessary and that I approve the established plan of care for up to 90 days or as specifically noted. Attestation, signature or co-signature of physician indicates approval of certification requirements.    ________________________ ____________ __________  Physician Signature   Date   Time  [address]  PHYSICAL THERAPY  [x] EVALUATION  [] DAILY NOTE (LAND) [] DAILY NOTE (AQUATIC ) [] PROGRESS NOTE [] DISCHARGE NOTE    [] [facility]   [] [facility] 90    [] [facility]   [x] Christopher Codyes    Date: 2023  Patient Name:  Lalita Stanford  : 1965  MRN: [id number]    Referring Practitioner Umesh Alcantar MD   Diagnosis Spondylolisthesis, lumbar region [M43.16]  Radiculopathy, lumbar region [M54.16]    Treatment Diagnosis Lumbar dysfunction, low back pain   Date of Evaluation 23    Additional Pertinent History HBP, obesity 2016- lumbar fusion. Functional Outcome Measure Used  Modified Oswestry    Functional Outcome Score 25 (23)       Insurance: Primary: Payor: [place] Single /  /  / ,   Secondary: MEDICAID [place]   Authorization Information: None needed   Visit # 1, 1/10 for progress note   Visits Allowed: Med nec   Recertification Date: 6 weeks, August 2    Physician Follow-Up: ,    Physician Orders: 2-3 times for 6 week   History of Present Illness: Chronic low back pain with several surgeries,  most recent . Since that time pain has remained in the left leg. Gradual increase pain over the last 6 months. MRI -  shows bone spurs and nerve compression with slippage forward.       SUBJECTIVE: She has

## 2023-06-23 ENCOUNTER — HOSPITAL ENCOUNTER (OUTPATIENT)
Dept: PHYSICAL THERAPY | Age: 58
Setting detail: THERAPIES SERIES
Discharge: HOME | End: 2023-06-23
Payer: MEDICARE

## 2023-06-23 PROCEDURE — 97110 THERAPEUTIC EXERCISES: CPT

## 2023-06-23 NOTE — PROGRESS NOTES
[address] [facility]  PHYSICAL THERAPY  [x] EVALUATION  [] DAILY NOTE (LAND) [] DAILY NOTE (AQUATIC ) [] PROGRESS NOTE [] DISCHARGE NOTE    [] [facility] [place]   [] Danny     [] [address] [facility]   [x] Kym Raeann    Date: 2023  Patient Name:  Jaja Tolliver  : 1965  MRN: [id number]    Referring Practitioner Erma Cooper MD   Diagnosis Spondylolisthesis, lumbar region [M43.16]  Radiculopathy, lumbar region [M54.16]    Treatment Diagnosis Lumbar dysfunction, low back pain   Date of Evaluation 23    Additional Pertinent History HBP, obesity 2016- lumbar fusion. Functional Outcome Measure Used  Modified Oswestry    Functional Outcome Score 25 (23)       Insurance: Primary: Payor: [place]ris Soto /  /  / ,   Secondary: MEDICAID [place]   Authorization Information: None needed   Visit # 2, 2/10 for progress note   Visits Allowed: Med nec   Recertification Date: 6 weeks,     Physician Follow-Up: ,    Physician Orders: 2-3 times for 6 week   History of Present Illness: Chronic low back pain with several surgeries,  most recent . Since that time pain has remained in the left leg. Gradual increase pain over the last 6 months. MRI -  shows bone spurs and nerve compression with slippage forward. SUBJECTIVE: Subjective reports of persistent low back pain and radiating symptoms down Left lower extremity. Rates 5/10 upon initial interview.       OBJECTIVE:  TREATMENT   Precautions:    Pain:     X in shaded column indicates activity completed today   Modalities Parameters/  Location  Notes                     Manual Therapy Time/Technique  Notes                     Exercise/Intervention   Notes          Pelvic til small range-- painful   10 reps   X    Abd sets, glut sets , ball squeeze , and abd with strap  5 sec 10    X    SAQ 10x ea  X    Supine Marching 10x  X    Lower  trunk rotation 10 reps

## 2023-06-28 ENCOUNTER — APPOINTMENT (OUTPATIENT)
Dept: PHYSICAL THERAPY | Age: 58
End: 2023-06-28
Payer: MEDICARE

## 2023-06-30 ENCOUNTER — HOSPITAL ENCOUNTER (OUTPATIENT)
Dept: PHYSICAL THERAPY | Age: 58
Setting detail: THERAPIES SERIES
Discharge: HOME | End: 2023-06-30
Payer: MEDICARE

## 2023-06-30 PROCEDURE — 97110 THERAPEUTIC EXERCISES: CPT

## 2023-07-05 ENCOUNTER — HOSPITAL ENCOUNTER (OUTPATIENT)
Dept: PHYSICAL THERAPY | Age: 58
Setting detail: THERAPIES SERIES
Discharge: HOME | End: 2023-07-05
Payer: MEDICARE

## 2023-07-05 PROCEDURE — 97035 APP MDLTY 1+ULTRASOUND EA 15: CPT

## 2023-07-05 PROCEDURE — 97110 THERAPEUTIC EXERCISES: CPT

## 2023-07-14 ENCOUNTER — HOSPITAL ENCOUNTER (OUTPATIENT)
Dept: PHYSICAL THERAPY | Age: 58
Setting detail: THERAPIES SERIES
Discharge: HOME | End: 2023-07-14
Payer: MEDICARE

## 2023-07-14 NOTE — PROGRESS NOTES
Pt. Reports high LB pain and L leg pain. Pt reports swelling in L LE and a \"hard\" spot on the L anterior hip. Pt plans to get checked by her doctor and contact the clinic on how she plans to proceed. Pt. Is placed on hold for today and Monday(7/17/23) and hopes to return for the scheduled reassessment on 7/19/23 with more information.     Donnell Roche, [id number]

## 2023-07-17 ENCOUNTER — APPOINTMENT (OUTPATIENT)
Dept: PHYSICAL THERAPY | Age: 58
End: 2023-07-17
Payer: MEDICARE

## 2023-07-19 ENCOUNTER — HOSPITAL ENCOUNTER (OUTPATIENT)
Dept: PHYSICAL THERAPY | Age: 58
Setting detail: THERAPIES SERIES
End: 2023-07-19
Payer: MEDICARE

## 2024-11-27 ENCOUNTER — HOSPITAL ENCOUNTER (OUTPATIENT)
Age: 59
Discharge: HOME | End: 2024-11-27
Payer: MEDICARE

## 2024-11-27 LAB
ALBUMIN SERPL BCG-MCNC: 4.3 G/DL (ref 3.5–5.1)
ALP SERPL-CCNC: 121 U/L (ref 38–126)
ALT SERPL W/O P-5'-P-CCNC: 15 U/L (ref 11–66)
ANION GAP SERPL CALC-SCNC: 9 MEQ/L (ref 8–16)
AST SERPL-CCNC: 19 U/L (ref 5–40)
BASOPHILS ABSOLUTE: 0.1 THOU/MM3 (ref 0–0.1)
BASOPHILS NFR BLD AUTO: 0.9 %
BILIRUB SERPL-MCNC: 0.4 MG/DL (ref 0.3–1.2)
BUN SERPL-MCNC: 12 MG/DL (ref 7–22)
CALCIUM SERPL-MCNC: 9.9 MG/DL (ref 8.5–10.5)
CHLORIDE SERPL-SCNC: 104 MEQ/L (ref 98–111)
CHOLEST SERPL-MCNC: 126 MG/DL (ref 100–199)
CO2 SERPL-SCNC: 30 MEQ/L (ref 23–33)
CREAT SERPL-MCNC: 0.6 MG/DL (ref 0.4–1.2)
DEPRECATED RDW RBC AUTO: 42.5 FL (ref 35–45)
EOSINOPHIL NFR BLD AUTO: 3.8 %
EOSINOPHILS ABSOLUTE: 0.5 THOU/MM3 (ref 0–0.4)
ERYTHROCYTE [DISTWIDTH] IN BLOOD BY AUTOMATED COUNT: 12.5 % (ref 11.5–14.5)
GFR SERPL CREATININE-BSD FRML MDRD: > 90 ML/MIN/1.73M2
GLUCOSE SERPL-MCNC: 88 MG/DL (ref 70–108)
HCT VFR BLD AUTO: 46 % (ref 37–47)
HDLC SERPL-MCNC: 61 MG/DL
HGB BLD-MCNC: 15.3 GM/DL (ref 12–16)
IMM GRANULOCYTES # BLD AUTO: 0.05 THOU/MM3 (ref 0–0.07)
IMM GRANULOCYTES NFR BLD AUTO: 0.4 %
LDLC SERPL CALC-MCNC: 49 MG/DL
LYMPHOCYTES ABSOLUTE: 2.5 THOU/MM3 (ref 1–4.8)
LYMPHOCYTES NFR BLD AUTO: 20.2 %
MAGNESIUM SERPL-MCNC: 1.8 MG/DL (ref 1.6–2.4)
MCH RBC QN AUTO: 30.8 PG (ref 26–33)
MCHC RBC AUTO-ENTMCNC: 33.3 GM/DL (ref 32.2–35.5)
MCV RBC AUTO: 92.6 FL (ref 81–99)
MONOCYTES ABSOLUTE: 0.9 THOU/MM3 (ref 0.4–1.3)
MONOCYTES NFR BLD AUTO: 7.4 %
NEUTROPHILS ABSOLUTE: 8.2 THOU/MM3 (ref 1.8–7.7)
NEUTROPHILS NFR BLD AUTO: 67.3 %
NRBC BLD AUTO-RTO: 0 /100 WBC
PLATELET # BLD AUTO: 313 THOU/MM3 (ref 130–400)
PMV BLD AUTO: 10 FL (ref 9.4–12.4)
POTASSIUM SERPL-SCNC: 4.5 MEQ/L (ref 3.5–5.2)
PROT SERPL-MCNC: 7.4 G/DL (ref 6.1–8)
RBC # BLD AUTO: 4.97 MILL/MM3 (ref 4.2–5.4)
SODIUM SERPL-SCNC: 143 MEQ/L (ref 135–145)
TRIGL SERPL-MCNC: 79 MG/DL (ref 0–199)
WBC # BLD AUTO: 12.2 THOU/MM3 (ref 4.8–10.8)

## 2024-11-27 PROCEDURE — 85027 COMPLETE CBC AUTOMATED: CPT

## 2024-11-27 PROCEDURE — 80053 COMPREHEN METABOLIC PANEL: CPT

## 2024-11-27 PROCEDURE — 83735 ASSAY OF MAGNESIUM: CPT

## 2024-11-27 PROCEDURE — 36415 COLL VENOUS BLD VENIPUNCTURE: CPT

## 2024-11-27 PROCEDURE — 80061 LIPID PANEL: CPT

## 2024-12-02 NOTE — PROGRESS NOTES
Zepbound ordered in 5/2024, this is same mediation as mounjaro. Did she fill it?    Her insurance does not cover mounjaro for weight loss, only for type 2 diabetes.   (2016); Cervical spine surgery; Foot surgery (Right); lumbar laminectomy (1-29-14); pr njx dx/ther sbst intrlmnr lmbr/sac w/img gdn (N/A, 9/11/2018); and pr njx dx/ther sbst intrlmnr lmbr/sac w/img gdn (N/A, 10/26/2018). Family History  This patient's family history includes Cancer in her father and sister; Diabetes in her brother, father, and mother; Heart Disease in her brother, father, and mother; High Blood Pressure in her father and mother; Stroke in her father. Social History  Minesh Davis  reports that she has been smoking. She has a 6.00 pack-year smoking history. She has never used smokeless tobacco. She reports that she uses drugs, including Marijuana. She reports that she does not drink alcohol. Medications    Current Outpatient Prescriptions:     tiZANidine (ZANAFLEX) 4 MG tablet, Take 1 tablet by mouth every 8 hours as needed (muscle spasms), Disp: 90 tablet, Rfl: 0    losartan-hydrochlorothiazide (HYZAAR) 100-25 MG per tablet, Take 1 tablet by mouth daily. , Disp: , Rfl:     levothyroxine (SYNTHROID) 175 MCG tablet, Take 175 mcg by mouth Daily. , Disp: , Rfl:     gabapentin (NEURONTIN) 400 MG capsule, Take 400 mg by mouth 3 times daily. , Disp: , Rfl:     citalopram (CELEXA) 40 MG tablet, Take 40 mg by mouth daily. , Disp: , Rfl:     metoprolol (TOPROL-XL) 50 MG XL tablet, Take 50 mg by mouth daily. , Disp: , Rfl:     buPROPion SR (WELLBUTRIN SR) 150 MG SR tablet, Take 150 mg by mouth daily. , Disp: , Rfl:     simvastatin (ZOCOR) 20 MG tablet, Take 20 mg by mouth daily. , Disp: , Rfl:     aspirin 81 MG tablet, Take 81 mg by mouth daily. , Disp: , Rfl:     Subjective:      Review of Systems   Constitutional: Positive for activity change and fatigue. Negative for chills, diaphoresis and fever. Respiratory: Negative for cough, chest tightness and shortness of breath. Cardiovascular: Negative for chest pain and palpitations.    Gastrointestinal: Positive for abdominal pain, constipation and

## (undated) DEVICE — BANDAGE ADH W1XL3IN NAT FAB WVN FLX DURABLE N ADH PD SEAL

## (undated) DEVICE — NEEDLE SYR 18GA L1.5IN RED PLAS HUB S STL BLNT FILL W/O

## (undated) DEVICE — 6 ML SYRINGE LUER-LOCK TIP: Brand: MONOJECT

## (undated) DEVICE — GLOVE ORANGE PI 7 1/2   MSG9075

## (undated) DEVICE — Device

## (undated) DEVICE — CHLORAPREP 26ML CLEAR

## (undated) DEVICE — SYRINGE MED 5ML STD CLR PLAS LUERLOCK TIP N CTRL DISP

## (undated) DEVICE — TOWEL,OR,DSP,ST,BLUE,STD,4/PK,20PK/CS: Brand: MEDLINE

## (undated) DEVICE — TOWEL,OR,DSP,ST,BLUE,DLX,4/PK,20PK/CS: Brand: MEDLINE

## (undated) DEVICE — GLOVE SURG SZ 65 THK91MIL LTX FREE SYN POLYISOPRENE

## (undated) DEVICE — SYRINGE MED 3ML CLR PLAS STD N CTRL LUERLOCK TIP DISP

## (undated) DEVICE — 3 ML SYRINGE LUER-LOCK TIP: Brand: MONOJECT

## (undated) DEVICE — SHEET,DRAPE,3/4,53X77,STERILE: Brand: MEDLINE

## (undated) DEVICE — NEEDLE SPNL 22GA L3.5IN BLK HUB S STL REG WALL FIT STYL W/

## (undated) DEVICE — GAUZE,SPONGE,4"X4",12PLY,STERILE,LF,2'S: Brand: MEDLINE

## (undated) DEVICE — SOLUTION IV 1000ML 0.9% SOD CHL PH 5 INJ USP VIAFLX PLAS

## (undated) DEVICE — SET ADMIN 25ML L117IN PMP MOD CK VLV RLER CLMP 2 SMRTSITE